# Patient Record
Sex: MALE | Race: WHITE | NOT HISPANIC OR LATINO | Employment: OTHER | URBAN - METROPOLITAN AREA
[De-identification: names, ages, dates, MRNs, and addresses within clinical notes are randomized per-mention and may not be internally consistent; named-entity substitution may affect disease eponyms.]

---

## 2023-06-28 PROBLEM — E78.2 MIXED HYPERLIPIDEMIA: Status: ACTIVE | Noted: 2023-06-28

## 2023-06-28 PROBLEM — J30.1 ALLERGIC RHINITIS DUE TO POLLEN: Status: ACTIVE | Noted: 2023-06-28

## 2023-06-28 PROBLEM — Z12.5 SCREENING PSA (PROSTATE SPECIFIC ANTIGEN): Status: ACTIVE | Noted: 2023-06-28

## 2023-06-28 PROBLEM — M48.062 SPINAL STENOSIS OF LUMBAR REGION WITH NEUROGENIC CLAUDICATION: Status: ACTIVE | Noted: 2023-06-28

## 2023-06-28 PROBLEM — E55.9 VITAMIN D DEFICIENCY: Status: ACTIVE | Noted: 2023-06-28

## 2023-07-26 ENCOUNTER — CLINICAL SUPPORT (OUTPATIENT)
Dept: INTERNAL MEDICINE | Facility: CLINIC | Age: 73
End: 2023-07-26
Payer: MEDICARE

## 2023-07-26 DIAGNOSIS — J30.1 ALLERGIC RHINITIS DUE TO POLLEN, UNSPECIFIED SEASONALITY: ICD-10-CM

## 2023-07-26 DIAGNOSIS — M48.062 SPINAL STENOSIS OF LUMBAR REGION WITH NEUROGENIC CLAUDICATION: ICD-10-CM

## 2023-07-26 DIAGNOSIS — Z12.5 SCREENING PSA (PROSTATE SPECIFIC ANTIGEN): ICD-10-CM

## 2023-07-26 DIAGNOSIS — E55.9 VITAMIN D DEFICIENCY: ICD-10-CM

## 2023-07-26 DIAGNOSIS — E78.2 MIXED HYPERLIPIDEMIA: ICD-10-CM

## 2023-07-26 LAB
25(OH)D3 SERPL-MCNC: 26.5 NG/ML (ref 30–100)
ALBUMIN SERPL-MCNC: 3.9 G/DL (ref 3.5–5.2)
ALBUMIN/GLOB SERPL: 1.3 G/DL
ALP SERPL-CCNC: 74 U/L (ref 39–117)
ALT SERPL W P-5'-P-CCNC: 18 U/L (ref 1–41)
ANION GAP SERPL CALCULATED.3IONS-SCNC: 11.3 MMOL/L (ref 5–15)
AST SERPL-CCNC: 28 U/L (ref 1–40)
BASOPHILS # BLD AUTO: 0.09 10*3/MM3 (ref 0–0.2)
BASOPHILS NFR BLD AUTO: 1 % (ref 0–1.5)
BILIRUB SERPL-MCNC: 0.3 MG/DL (ref 0–1.2)
BUN SERPL-MCNC: 14 MG/DL (ref 8–23)
BUN/CREAT SERPL: 14.6 (ref 7–25)
CALCIUM SPEC-SCNC: 9 MG/DL (ref 8.6–10.5)
CHLORIDE SERPL-SCNC: 108 MMOL/L (ref 98–107)
CHOLEST SERPL-MCNC: 158 MG/DL (ref 0–200)
CO2 SERPL-SCNC: 22.7 MMOL/L (ref 22–29)
CREAT SERPL-MCNC: 0.96 MG/DL (ref 0.76–1.27)
DEPRECATED RDW RBC AUTO: 46.2 FL (ref 37–54)
EGFRCR SERPLBLD CKD-EPI 2021: 85 ML/MIN/1.73
EOSINOPHIL # BLD AUTO: 0.21 10*3/MM3 (ref 0–0.4)
EOSINOPHIL NFR BLD AUTO: 2.4 % (ref 0.3–6.2)
ERYTHROCYTE [DISTWIDTH] IN BLOOD BY AUTOMATED COUNT: 13.7 % (ref 12.3–15.4)
FOLATE SERPL-MCNC: 9.93 NG/ML (ref 4.78–24.2)
GLOBULIN UR ELPH-MCNC: 2.9 GM/DL
GLUCOSE SERPL-MCNC: 107 MG/DL (ref 65–99)
HCT VFR BLD AUTO: 50.1 % (ref 37.5–51)
HCV AB SER DONR QL: NORMAL
HDLC SERPL-MCNC: 49 MG/DL (ref 40–60)
HGB BLD-MCNC: 16.7 G/DL (ref 13–17.7)
IMM GRANULOCYTES # BLD AUTO: 0.08 10*3/MM3 (ref 0–0.05)
IMM GRANULOCYTES NFR BLD AUTO: 0.9 % (ref 0–0.5)
LDLC SERPL CALC-MCNC: 99 MG/DL (ref 0–100)
LDLC/HDLC SERPL: 2.03 {RATIO}
LYMPHOCYTES # BLD AUTO: 1.55 10*3/MM3 (ref 0.7–3.1)
LYMPHOCYTES NFR BLD AUTO: 18 % (ref 19.6–45.3)
MCH RBC QN AUTO: 31.2 PG (ref 26.6–33)
MCHC RBC AUTO-ENTMCNC: 33.3 G/DL (ref 31.5–35.7)
MCV RBC AUTO: 93.6 FL (ref 79–97)
MONOCYTES # BLD AUTO: 0.93 10*3/MM3 (ref 0.1–0.9)
MONOCYTES NFR BLD AUTO: 10.8 % (ref 5–12)
NEUTROPHILS NFR BLD AUTO: 5.77 10*3/MM3 (ref 1.7–7)
NEUTROPHILS NFR BLD AUTO: 66.9 % (ref 42.7–76)
NRBC BLD AUTO-RTO: 0 /100 WBC (ref 0–0.2)
PLATELET # BLD AUTO: 231 10*3/MM3 (ref 140–450)
PMV BLD AUTO: 10.6 FL (ref 6–12)
POTASSIUM SERPL-SCNC: 5.1 MMOL/L (ref 3.5–5.2)
PROT SERPL-MCNC: 6.8 G/DL (ref 6–8.5)
PSA SERPL-MCNC: 1.11 NG/ML (ref 0–4)
RBC # BLD AUTO: 5.35 10*6/MM3 (ref 4.14–5.8)
SODIUM SERPL-SCNC: 142 MMOL/L (ref 136–145)
T4 FREE SERPL-MCNC: 0.98 NG/DL (ref 0.93–1.7)
TRIGL SERPL-MCNC: 48 MG/DL (ref 0–150)
TSH SERPL DL<=0.05 MIU/L-ACNC: 2.22 UIU/ML (ref 0.27–4.2)
VIT B12 BLD-MCNC: 512 PG/ML (ref 211–946)
VLDLC SERPL-MCNC: 10 MG/DL (ref 5–40)
WBC NRBC COR # BLD: 8.63 10*3/MM3 (ref 3.4–10.8)

## 2023-07-26 PROCEDURE — 82607 VITAMIN B-12: CPT | Performed by: INTERNAL MEDICINE

## 2023-07-26 PROCEDURE — 86803 HEPATITIS C AB TEST: CPT | Performed by: INTERNAL MEDICINE

## 2023-07-26 PROCEDURE — 36415 COLL VENOUS BLD VENIPUNCTURE: CPT | Performed by: INTERNAL MEDICINE

## 2023-07-26 PROCEDURE — 80061 LIPID PANEL: CPT | Performed by: INTERNAL MEDICINE

## 2023-07-26 PROCEDURE — G0103 PSA SCREENING: HCPCS | Performed by: INTERNAL MEDICINE

## 2023-07-26 PROCEDURE — 85025 COMPLETE CBC W/AUTO DIFF WBC: CPT | Performed by: INTERNAL MEDICINE

## 2023-07-26 PROCEDURE — 84439 ASSAY OF FREE THYROXINE: CPT | Performed by: INTERNAL MEDICINE

## 2023-07-26 PROCEDURE — 80053 COMPREHEN METABOLIC PANEL: CPT | Performed by: INTERNAL MEDICINE

## 2023-07-26 PROCEDURE — 84443 ASSAY THYROID STIM HORMONE: CPT | Performed by: INTERNAL MEDICINE

## 2023-07-26 PROCEDURE — 82306 VITAMIN D 25 HYDROXY: CPT | Performed by: INTERNAL MEDICINE

## 2023-07-26 PROCEDURE — 82746 ASSAY OF FOLIC ACID SERUM: CPT | Performed by: INTERNAL MEDICINE

## 2023-07-31 ENCOUNTER — OFFICE VISIT (OUTPATIENT)
Dept: INTERNAL MEDICINE | Facility: CLINIC | Age: 73
End: 2023-07-31
Payer: MEDICARE

## 2023-07-31 VITALS
WEIGHT: 222.6 LBS | TEMPERATURE: 98 F | BODY MASS INDEX: 30.15 KG/M2 | OXYGEN SATURATION: 95 % | HEART RATE: 80 BPM | DIASTOLIC BLOOD PRESSURE: 93 MMHG | HEIGHT: 72 IN | SYSTOLIC BLOOD PRESSURE: 171 MMHG

## 2023-07-31 DIAGNOSIS — M48.062 SPINAL STENOSIS OF LUMBAR REGION WITH NEUROGENIC CLAUDICATION: ICD-10-CM

## 2023-07-31 DIAGNOSIS — Z12.5 SCREENING PSA (PROSTATE SPECIFIC ANTIGEN): ICD-10-CM

## 2023-07-31 DIAGNOSIS — R73.01 IFG (IMPAIRED FASTING GLUCOSE): ICD-10-CM

## 2023-07-31 DIAGNOSIS — E78.2 MIXED HYPERLIPIDEMIA: ICD-10-CM

## 2023-07-31 DIAGNOSIS — Z00.00 MEDICARE ANNUAL WELLNESS VISIT, SUBSEQUENT: Primary | ICD-10-CM

## 2023-07-31 DIAGNOSIS — E55.9 VITAMIN D DEFICIENCY: ICD-10-CM

## 2023-07-31 PROCEDURE — 99213 OFFICE O/P EST LOW 20 MIN: CPT | Performed by: INTERNAL MEDICINE

## 2023-07-31 PROCEDURE — G0439 PPPS, SUBSEQ VISIT: HCPCS | Performed by: INTERNAL MEDICINE

## 2023-11-20 ENCOUNTER — PROCEDURE VISIT (OUTPATIENT)
Dept: OTOLARYNGOLOGY | Facility: CLINIC | Age: 73
End: 2023-11-20
Payer: MEDICARE

## 2023-11-20 ENCOUNTER — OFFICE VISIT (OUTPATIENT)
Dept: OTOLARYNGOLOGY | Facility: CLINIC | Age: 73
End: 2023-11-20
Payer: MEDICARE

## 2023-11-20 VITALS
SYSTOLIC BLOOD PRESSURE: 161 MMHG | BODY MASS INDEX: 30.18 KG/M2 | HEART RATE: 81 BPM | HEIGHT: 72 IN | DIASTOLIC BLOOD PRESSURE: 82 MMHG | TEMPERATURE: 97.6 F

## 2023-11-20 DIAGNOSIS — J30.0 VASOMOTOR RHINITIS: ICD-10-CM

## 2023-11-20 DIAGNOSIS — H90.A22 SENSORINEURAL HEARING LOSS (SNHL) OF LEFT EAR WITH RESTRICTED HEARING OF RIGHT EAR: ICD-10-CM

## 2023-11-20 DIAGNOSIS — H90.A21 SENSORINEURAL HEARING LOSS (SNHL) OF RIGHT EAR WITH RESTRICTED HEARING OF LEFT EAR: Primary | ICD-10-CM

## 2023-11-20 DIAGNOSIS — Z72.0 TOBACCO ABUSE: ICD-10-CM

## 2023-11-20 DIAGNOSIS — H90.A31 MIXED CONDUCTIVE AND SENSORINEURAL HEARING LOSS OF RIGHT EAR WITH RESTRICTED HEARING OF LEFT EAR: ICD-10-CM

## 2023-11-20 DIAGNOSIS — G51.0 PERIPHERAL NERVE FACIAL NERVE PARALYSIS: ICD-10-CM

## 2023-11-20 DIAGNOSIS — H90.3 SENSORINEURAL HEARING LOSS, BILATERAL: ICD-10-CM

## 2023-11-20 DIAGNOSIS — T16.1XXA ACUTE FOREIGN BODY OF RIGHT EAR CANAL, INITIAL ENCOUNTER: Primary | ICD-10-CM

## 2023-11-20 DIAGNOSIS — L74.519 GUSTATORY SWEATING: ICD-10-CM

## 2023-11-20 PROCEDURE — 1159F MED LIST DOCD IN RCRD: CPT | Performed by: OTOLARYNGOLOGY

## 2023-11-20 PROCEDURE — 69200 CLEAR OUTER EAR CANAL: CPT | Performed by: OTOLARYNGOLOGY

## 2023-11-20 PROCEDURE — 99204 OFFICE O/P NEW MOD 45 MIN: CPT | Performed by: OTOLARYNGOLOGY

## 2023-11-20 PROCEDURE — 1160F RVW MEDS BY RX/DR IN RCRD: CPT | Performed by: OTOLARYNGOLOGY

## 2023-11-20 RX ORDER — IPRATROPIUM BROMIDE 42 UG/1
2 SPRAY, METERED NASAL 3 TIMES DAILY
Qty: 45 ML | Refills: 6 | Status: SHIPPED | OUTPATIENT
Start: 2023-11-20

## 2023-11-20 NOTE — PROGRESS NOTES
Patient Name: Jose Francisco Gold   Visit Date: 11/20/2023   Patient ID: [id number]  Provider: Cl Gipson MD    Sex: male  Location: [facility]   YOB: 1950  Location Address: [address] [place] [address], [address] [place] [address],?[place]?[id number]    Primary Care Provider Jamar Sher MD  Location Phone: [phone]    Referring Provider: No ref. provider found        Chief Complaint  RIGHT EAR HEARING LOSS,WAX IMPACTION    History of Present Illness  Jose Francisco Gold is a 73 y.o. male who presents to [facility] for RIGHT EAR HEARING LOSS,WAX IMPACTION.  Patient has history of sensorineural hearing loss he was last seen by Ms. Mary Cohen at [facility] a month ago for ear cleaning.  Patient's last audiogram was by Teddy Perez at [facility] better hearing aid about 4 years ago.  Patient is here to have his ears checked as it is making sure there is no growth as suggested from previous exam.  Also patient has a history of left parotidectomy done in [place] several years ago and has the benign mass removed but he resulted and having a frontal branch of the facial nerve paralyzed.  He also reports having's sweating when he is eating from the left side suggestive of gustatory sweating as well.  Unfortunately also patient has left side of the nose with draining when he eats also but I suspect this may be due to vasomotor rhinitis and not related to the parotidectomy.    Past Medical History:   Diagnosis Date    High cholesterol        History reviewed. No pertinent surgical history.      Current Outpatient Medications:     atorvastatin (LIPITOR) 20 MG tablet, Take 1 tablet by mouth Daily., Disp: 90 tablet, Rfl: 3    sildenafil (VIAGRA) 100 MG tablet, Take 1 tablet by mouth As Needed for Erectile Dysfunction., Disp: 10 tablet, Rfl: 5    vitamin B-12 (CYANOCOBALAMIN) 500 MCG tablet, Take 0.5 tablets by mouth Daily., Disp: , Rfl:     ipratropium (ATROVENT) 0.06 %  "nasal spray, 2 sprays into the nostril(s) as directed by provider 3 (Three) Times a Day., Disp: 45 mL, Rfl: 6     No Known Allergies    Social History     Tobacco Use    Smoking status: Every Day     Types: Cigars    Smokeless tobacco: Never   Vaping Use    Vaping Use: Never used   Substance Use Topics    Alcohol use: Yes     Comment: 4 beers a day    Drug use: Never        Objective     Vital Signs:   Vitals:    11/20/23 0751   BP: 161/82   BP Location: Left arm   Patient Position: Sitting   Cuff Size: Adult   Pulse: 81   Temp: 97.6 °F (36.4 °C)   TempSrc: Temporal   Height: 182.9 cm (72.01\")       Tobacco Use: High Risk (11/20/2023)    Patient History     Smoking Tobacco Use: Every Day     Smokeless Tobacco Use: Never     Passive Exposure: Not on file         Physical Exam    Constitutional   Appearance  well developed, well-nourished, alert and in no acute distress, voice clear and strong    Head   Inspection  no deformities or lesions      Face   Inspection  no facial lesions; left frontal branch of facial nerve total paralysis otherwise rest of the facial nerve branches were unremarkable.   Palpation  no TMJ crepitus nor  muscle tenderness bilaterally.  Also left preauricular area consistent with prior left parotidectomy incision healed.    Eyes/Vision   Visual Fields  extraocular movements are intact, no spontaneous or gaze-induced nystagmus  Conjunctivae  clear   Sclerae  clear   Pupils and Irises  pupils equal, round, and reactive to light.   Nystagmus  not present     Ears, Nose, Mouth and Throat  Ears  External Ears  appearance within normal limits, no lesions present   Otoscopic Examination  Left TM and EACs are clear.  Right ear canal was unremarkable but the against the tympanic membrane is a cotton foreign body removed along with some of the moisture present and a little bit of sunken area up against the tympanic membrane.  And this was all cleaned and removed with aid of " microscope.  Hearing  intact to conversational voice both ears.  Patient has hearing loss and does wear hearing aids on both ears  Tunning fork testing    Rinne:  Pickett:    Nose  External Nose  appearance normal   Intranasal Exam  mucosa within normal limits, vestibules normal, no intranasal lesions present, septum midline, sinuses non tender to percussion   Modified Schoolcraft Test:    Oral Cavity  Oral Mucosa  oral mucosa normal without pallor or cyanosis   Lips  lip appearance normal   Teeth  normal dentition for age   Gums  gums pink, non-swollen, no bleeding present   Tongue  tongue appearance normal; normal mobility   Palate  hard palate normal, soft palate appearance normal with symmetric mobility     Throat  Oropharynx  no inflammation or lesions present, tonsils within normal limits   Hypopharynx  appearance within normal limits   Larynx  voice normal     Neck  Inspection/Palpation  normal appearance, no masses or tenderness, trachea midline; thyroid size normal, nontender, no nodules or masses present on palpation     Lymphatic  Neck  no lymphadenopathy present   Supraclavicular Nodes  no lymphadenopathy present   Preauricular Nodes  no lymphadenopathy present     Respiratory  Respiratory Effort  breathing unlabored   Inspection of Chest  normal appearance, no retractions     Musculoskeletal   Cervical back: Normal range of motion and neck supple.      Skin and Subcutaneous Tissue  General Inspection  Regarding face and neck - there are no rashes present, no lesions present, and no areas of discoloration     Neurologic  Cranial Nerves  cranial nerves II-XII are grossly intact bilaterally.  Please see the above description of left frontal branch of facial nerve paralysis  Gait and Station  normal gait, able to stand without diffculty    Psychiatric  Judgement and Insight  judgment and insight intact   Mood and Affect  mood normal, affect appropriate       RESULTS REVIEWED    I have reviewed the following  information:   [x]  Previous Internal Note  [x]  Previous External Note:   [x]  Ordered Tests & Results:      Pathology:      TSH   Date Value Ref Range Status   07/26/2023 2.220 0.270 - 4.200 uIU/mL Final     Free T4   Date Value Ref Range Status   07/26/2023 0.98 0.93 - 1.70 ng/dL Final     Comment:     T4 results may be falsely increased if patient taking Biotin.     Calcium   Date Value Ref Range Status   07/26/2023 9.0 8.6 - 10.5 mg/dL Final   01/13/2022 8.7 8.6 - 10.3 mg/dL Final     25 Hydroxy, Vitamin D   Date Value Ref Range Status   07/26/2023 26.5 (L) 30.0 - 100.0 ng/ml Final       No Images in the past 120 days found..    I have discussed the interpretation of the above results with the patient.    Foreign Body Removal    Date/Time: 11/20/2023 8:57 AM    Performed by: Cl Gipson MD  Authorized by: Cl Gipson MD  Body area: ear  Location details: right ear  Removal mechanism: alligator forceps and suction  Post-procedure assessment: foreign body removed  Comments: Patient deep in the right ear canal with foreign body cotton stuck against tympanic membrane a little sunken area of the inferior aspect of the canal which was removed under the aid of microscope using alligator forceps and suction.  Also residual moisture caused epithelial debris accumulation that was removed with suction as well.  However there is no acute infection present at this time.              Assessment and Plan   Diagnoses and all orders for this visit:    1. Acute foreign body of right ear canal, initial encounter (Primary)  -     Foreign Body Removal    2. Mixed conductive and sensorineural hearing loss of right ear with restricted hearing of left ear  -     Foreign Body Removal    3. Sensorineural hearing loss, bilateral  -     Comprehensive Hearing Test; Future    4. Tobacco abuse    5. Peripheral nerve facial nerve paralysis    6. Gustatory sweating    7. Vasomotor rhinitis  -     ipratropium (ATROVENT) 0.06 % nasal  spray; 2 sprays into the nostril(s) as directed by provider 3 (Three) Times a Day.  Dispense: 45 mL; Refill: 6        Jose Francisco Gold  reports that he has been smoking cigars. He has never used smokeless tobacco. I have educated him on the risk of diseases from using tobacco products such as Cancer, COPD & Heart Disease.     I advised him to quit smoking cigars.  I spent 5 minutes counseling the patient.       Plan:  Patient Instructions   1.  Patient has coincidental right ear canal foreign body removed with microscope.  There was no residual infection present.  2.  Patient's conductive component had resolved on the hearing loss but he does have a long term bilateral sensorineural hearing loss.  He wears hearing aids in both ears.  3.  Patient has bilateral sensorineural hearing loss with last audiogram about 4 years ago.  We will proceed with hearing test today if possible.  4.  Patient has tobacco abuse from lung history of cigar smoking and recommended he quit that as well.  5.  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  6.  Patient also from left parotidectomy has gustatory sweating which I recommend using some antiperspirant.  7.  For vasomotor rhinitis I would recommend trying Atrovent nasal spray and hope that this would help with his runny nose while he is eating.  He is to use 2 puffs in the left nostril half hour before meal 3 times a day.  8.  I will see Mr. Gold back probably and 6 months for ear exam and cleaning.    57 minutes were spent caring for Jose Francisco on this date of service. This time spent by me includes preparing for the visit, reviewing tests, obtaining/reviewing separately obtained history, performing medically appropriate exam/evaluation, counseling/educating the patient/family/caregiver, ordering medications/tests/procedures, referring/communicating with other health care  professionals, documenting information in the medical record, independently interpreting results and communicating that with the patient/family/caregiver and/or care coordination.       Follow Up   Return in about 6 months (around 5/20/2024), or Ear exam and cleaning.  Patient was given instructions and counseling regarding his condition or for health maintenance advice. Please see specific information pulled into the AVS if appropriate.

## 2023-11-20 NOTE — PATIENT INSTRUCTIONS
1.  Patient has coincidental right ear canal foreign body removed with microscope.  There was no residual infection present.  2.  Patient's conductive component had resolved on the hearing loss but he does have a long term bilateral sensorineural hearing loss.  He wears hearing aids in both ears.  3.  Patient has bilateral sensorineural hearing loss with last audiogram about 4 years ago.  We will proceed with hearing test today if possible.  4.  Patient has tobacco abuse from lung history of cigar smoking and recommended he quit that as well.  5.  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  6.  Patient also from left parotidectomy has gustatory sweating which I recommend using some antiperspirant.  7.  For vasomotor rhinitis I would recommend trying Atrovent nasal spray and hope that this would help with his runny nose while he is eating.  He is to use 2 puffs in the left nostril half hour before meal 3 times a day.  8.  I will see Mr. Gold back probably and 6 months for ear exam and cleaning.

## 2023-11-20 NOTE — PROGRESS NOTES
AUDIOMETRIC EVALUATION      Name:  Jose Francisco Gold  :  1950  Age:  73 y.o.  Date of Evaluation:  2023       History:  Mr. Gold is seen today for a hearing evaluation due to hearing loss.    Audiologic Information:  Concerns for Hearing: Yes  PETs: No  Other otologic surgical history: None  Aural Pressure/Fullness: No  Otalgia: None  Otorrhea: No  Tinnitus: Yes each ear  Dizziness: No  Noise Exposure: Yes  Family history of hearing loss: No  Head trauma requiring hospital stay: None  Chemotherapy: No  Other significant history: No    EVALUATION:    See audiogram    RESULTS:    Otoscopic Evaluation:        NOTE: Testing completed after ears were examined by Dr. Gipson    Tympanometry (226 Hz):  Right: Type A  Left: Type A    IMPRESSIONS:  Pure tone thresholds for the right ear shows a mild sensorineural hearing loss at 0.25 K to 1K hertz.  Severe to profound hearing loss at 1.5K to 8K hertz.  Pure tone thresholds for the left ear shows a moderate to profound sensorineural hearing loss.  Patient was counseled with regard to the findings.    Amplification needs: Currently wears binaural hearing instruments    RECOMMENDATIONS/PLAN:  Follow-up recommendations of Dr. Gipson  Discussed results and recommendations with patient.           Matias العراقي M.S, [facility]  Licensed Audiologist

## 2024-01-18 ENCOUNTER — CLINICAL SUPPORT (OUTPATIENT)
Dept: INTERNAL MEDICINE | Facility: CLINIC | Age: 74
End: 2024-01-18
Payer: MEDICARE

## 2024-01-18 DIAGNOSIS — Z00.00 MEDICARE ANNUAL WELLNESS VISIT, SUBSEQUENT: ICD-10-CM

## 2024-01-18 DIAGNOSIS — E55.9 VITAMIN D DEFICIENCY: ICD-10-CM

## 2024-01-18 DIAGNOSIS — E78.2 MIXED HYPERLIPIDEMIA: ICD-10-CM

## 2024-01-18 DIAGNOSIS — M48.062 SPINAL STENOSIS OF LUMBAR REGION WITH NEUROGENIC CLAUDICATION: ICD-10-CM

## 2024-01-18 DIAGNOSIS — Z12.5 SCREENING PSA (PROSTATE SPECIFIC ANTIGEN): ICD-10-CM

## 2024-01-18 DIAGNOSIS — R73.01 IFG (IMPAIRED FASTING GLUCOSE): ICD-10-CM

## 2024-01-18 LAB
ALBUMIN SERPL-MCNC: 4.1 G/DL (ref 3.5–5.2)
ALBUMIN/GLOB SERPL: 1.5 G/DL
ALP SERPL-CCNC: 83 U/L (ref 39–117)
ALT SERPL W P-5'-P-CCNC: 16 U/L (ref 1–41)
ANION GAP SERPL CALCULATED.3IONS-SCNC: 10 MMOL/L (ref 5–15)
AST SERPL-CCNC: 24 U/L (ref 1–40)
BASOPHILS # BLD AUTO: 0.06 10*3/MM3 (ref 0–0.2)
BASOPHILS NFR BLD AUTO: 0.7 % (ref 0–1.5)
BILIRUB SERPL-MCNC: 0.2 MG/DL (ref 0–1.2)
BUN SERPL-MCNC: 13 MG/DL (ref 8–23)
BUN/CREAT SERPL: 12.4 (ref 7–25)
CALCIUM SPEC-SCNC: 9.3 MG/DL (ref 8.6–10.5)
CHLORIDE SERPL-SCNC: 106 MMOL/L (ref 98–107)
CHOLEST SERPL-MCNC: 153 MG/DL (ref 0–200)
CO2 SERPL-SCNC: 25 MMOL/L (ref 22–29)
CREAT SERPL-MCNC: 1.05 MG/DL (ref 0.76–1.27)
DEPRECATED RDW RBC AUTO: 45.9 FL (ref 37–54)
EGFRCR SERPLBLD CKD-EPI 2021: 75.9 ML/MIN/1.73
EOSINOPHIL # BLD AUTO: 0.21 10*3/MM3 (ref 0–0.4)
EOSINOPHIL NFR BLD AUTO: 2.3 % (ref 0.3–6.2)
ERYTHROCYTE [DISTWIDTH] IN BLOOD BY AUTOMATED COUNT: 13.8 % (ref 12.3–15.4)
GLOBULIN UR ELPH-MCNC: 2.8 GM/DL
GLUCOSE SERPL-MCNC: 101 MG/DL (ref 65–99)
HBA1C MFR BLD: 6 % (ref 4.8–5.6)
HCT VFR BLD AUTO: 47.7 % (ref 37.5–51)
HDLC SERPL-MCNC: 47 MG/DL (ref 40–60)
HGB BLD-MCNC: 15.6 G/DL (ref 13–17.7)
IMM GRANULOCYTES # BLD AUTO: 0.05 10*3/MM3 (ref 0–0.05)
IMM GRANULOCYTES NFR BLD AUTO: 0.5 % (ref 0–0.5)
LDLC SERPL CALC-MCNC: 95 MG/DL (ref 0–100)
LDLC/HDLC SERPL: 2.04 {RATIO}
LYMPHOCYTES # BLD AUTO: 1.41 10*3/MM3 (ref 0.7–3.1)
LYMPHOCYTES NFR BLD AUTO: 15.4 % (ref 19.6–45.3)
MCH RBC QN AUTO: 29.8 PG (ref 26.6–33)
MCHC RBC AUTO-ENTMCNC: 32.7 G/DL (ref 31.5–35.7)
MCV RBC AUTO: 91.2 FL (ref 79–97)
MONOCYTES # BLD AUTO: 1.08 10*3/MM3 (ref 0.1–0.9)
MONOCYTES NFR BLD AUTO: 11.8 % (ref 5–12)
NEUTROPHILS NFR BLD AUTO: 6.37 10*3/MM3 (ref 1.7–7)
NEUTROPHILS NFR BLD AUTO: 69.3 % (ref 42.7–76)
NRBC BLD AUTO-RTO: 0 /100 WBC (ref 0–0.2)
PLATELET # BLD AUTO: 243 10*3/MM3 (ref 140–450)
PMV BLD AUTO: 10.6 FL (ref 6–12)
POTASSIUM SERPL-SCNC: 5.1 MMOL/L (ref 3.5–5.2)
PROT SERPL-MCNC: 6.9 G/DL (ref 6–8.5)
RBC # BLD AUTO: 5.23 10*6/MM3 (ref 4.14–5.8)
SODIUM SERPL-SCNC: 141 MMOL/L (ref 136–145)
TRIGL SERPL-MCNC: 50 MG/DL (ref 0–150)
VLDLC SERPL-MCNC: 11 MG/DL (ref 5–40)
WBC NRBC COR # BLD AUTO: 9.18 10*3/MM3 (ref 3.4–10.8)

## 2024-01-18 PROCEDURE — 83036 HEMOGLOBIN GLYCOSYLATED A1C: CPT | Performed by: INTERNAL MEDICINE

## 2024-01-18 PROCEDURE — 85025 COMPLETE CBC W/AUTO DIFF WBC: CPT | Performed by: INTERNAL MEDICINE

## 2024-01-18 PROCEDURE — 80061 LIPID PANEL: CPT | Performed by: INTERNAL MEDICINE

## 2024-01-18 PROCEDURE — 80053 COMPREHEN METABOLIC PANEL: CPT | Performed by: INTERNAL MEDICINE

## 2024-01-18 PROCEDURE — 36415 COLL VENOUS BLD VENIPUNCTURE: CPT | Performed by: INTERNAL MEDICINE

## 2024-01-31 ENCOUNTER — OFFICE VISIT (OUTPATIENT)
Dept: INTERNAL MEDICINE | Facility: CLINIC | Age: 74
End: 2024-01-31
Payer: MEDICARE

## 2024-01-31 VITALS
WEIGHT: 227 LBS | HEART RATE: 71 BPM | DIASTOLIC BLOOD PRESSURE: 85 MMHG | OXYGEN SATURATION: 94 % | BODY MASS INDEX: 30.75 KG/M2 | SYSTOLIC BLOOD PRESSURE: 148 MMHG | HEIGHT: 72 IN | TEMPERATURE: 98.6 F

## 2024-01-31 DIAGNOSIS — Z12.5 SCREENING PSA (PROSTATE SPECIFIC ANTIGEN): ICD-10-CM

## 2024-01-31 DIAGNOSIS — R73.01 IFG (IMPAIRED FASTING GLUCOSE): ICD-10-CM

## 2024-01-31 DIAGNOSIS — E78.2 MIXED HYPERLIPIDEMIA: ICD-10-CM

## 2024-01-31 DIAGNOSIS — H90.3 SENSORINEURAL HEARING LOSS, BILATERAL: ICD-10-CM

## 2024-01-31 DIAGNOSIS — J30.1 SEASONAL ALLERGIC RHINITIS DUE TO POLLEN: ICD-10-CM

## 2024-01-31 DIAGNOSIS — E55.9 VITAMIN D DEFICIENCY: ICD-10-CM

## 2024-01-31 DIAGNOSIS — Z87.891 PERSONAL HISTORY OF NICOTINE DEPENDENCE: ICD-10-CM

## 2024-01-31 DIAGNOSIS — M48.062 SPINAL STENOSIS OF LUMBAR REGION WITH NEUROGENIC CLAUDICATION: Primary | ICD-10-CM

## 2024-01-31 DIAGNOSIS — Z72.0 TOBACCO ABUSE: ICD-10-CM

## 2024-01-31 PROCEDURE — 1170F FXNL STATUS ASSESSED: CPT | Performed by: INTERNAL MEDICINE

## 2024-01-31 PROCEDURE — 99214 OFFICE O/P EST MOD 30 MIN: CPT | Performed by: INTERNAL MEDICINE

## 2024-01-31 NOTE — PROGRESS NOTES
"CHIEF COMPLAINT/ HPI:  Hyperlipidemia (Routine follow up. Lab follow up. Referral to Gastro for Colonoscopy, been 2 years since last one. )              Objective   Vital Signs  Vitals:    01/31/24 1204   BP: 148/85   Pulse: 71   Temp: 98.6 °F (37 °C)   SpO2: 94%   Weight: 103 kg (227 lb)   Height: 182.9 cm (72.01\")      Body mass index is 30.78 kg/m².  Review of Systems   Constitutional: Negative.    HENT: Negative.     Eyes: Negative.    Respiratory: Negative.     Cardiovascular: Negative.    Gastrointestinal: Negative.    Endocrine: Negative.    Genitourinary: Negative.    Musculoskeletal: Negative.    Allergic/Immunologic: Negative.    Neurological: Negative.    Hematological: Negative.    Psychiatric/Behavioral: Negative.        Physical Exam  Constitutional:       General: He is not in acute distress.     Appearance: Normal appearance.   HENT:      Head: Normocephalic.      Mouth/Throat:      Mouth: Mucous membranes are moist.   Eyes:      Conjunctiva/sclera: Conjunctivae normal.      Pupils: Pupils are equal, round, and reactive to light.   Cardiovascular:      Rate and Rhythm: Normal rate and regular rhythm.      Pulses: Normal pulses.      Heart sounds: Normal heart sounds.   Pulmonary:      Effort: Pulmonary effort is normal.      Breath sounds: Normal breath sounds.   Abdominal:      General: Abdomen is flat. Bowel sounds are normal.      Palpations: Abdomen is soft.   Musculoskeletal:         General: No swelling. Normal range of motion.      Cervical back: Neck supple.   Skin:     General: Skin is warm and dry.      Coloration: Skin is not jaundiced.   Neurological:      General: No focal deficit present.      Mental Status: He is alert and oriented to person, place, and time. Mental status is at baseline.   Psychiatric:         Mood and Affect: Mood normal.         Behavior: Behavior normal.         Thought Content: Thought content normal.         Judgment: Judgment normal.        Result Review :   No " "results found for: \"PROBNP\", \"BNP\"  CMP          7/26/2023    10:40 1/18/2024    09:06   CMP   Glucose 107  101    BUN 14  13    Creatinine 0.96  1.05    EGFR 85.0  75.9    Sodium 142  141    Potassium 5.1  5.1    Chloride 108  106    Calcium 9.0  9.3    Total Protein 6.8  6.9    Albumin 3.9  4.1    Globulin 2.9  2.8    Total Bilirubin 0.3  0.2    Alkaline Phosphatase 74  83    AST (SGOT) 28  24    ALT (SGPT) 18  16    Albumin/Globulin Ratio 1.3  1.5    BUN/Creatinine Ratio 14.6  12.4    Anion Gap 11.3  10.0      CBC w/diff          7/26/2023    10:40 1/18/2024    09:06   CBC w/Diff   WBC 8.63  9.18    RBC 5.35  5.23    Hemoglobin 16.7  15.6    Hematocrit 50.1  47.7    MCV 93.6  91.2    MCH 31.2  29.8    MCHC 33.3  32.7    RDW 13.7  13.8    Platelets 231  243    Neutrophil Rel % 66.9  69.3    Immature Granulocyte Rel % 0.9  0.5    Lymphocyte Rel % 18.0  15.4    Monocyte Rel % 10.8  11.8    Eosinophil Rel % 2.4  2.3    Basophil Rel % 1.0  0.7       Lipid Panel          7/26/2023    10:40 1/18/2024    09:06   Lipid Panel   Total Cholesterol 158  153    Triglycerides 48  50    HDL Cholesterol 49  47    VLDL Cholesterol 10  11    LDL Cholesterol  99  95    LDL/HDL Ratio 2.03  2.04       Lab Results   Component Value Date    TSH 2.220 07/26/2023      Lab Results   Component Value Date    FREET4 0.98 07/26/2023      A1C Last 3 Results          1/18/2024    09:06   HGBA1C Last 3 Results   Hemoglobin A1C 6.00       PSA          7/26/2023    10:40   PSA   PSA 1.110                     Visit Diagnoses:    ICD-10-CM ICD-9-CM   1. Spinal stenosis of lumbar region with neurogenic claudication  M48.062 724.03   2. Mixed hyperlipidemia  E78.2 272.2   3. Vitamin D deficiency  E55.9 268.9   4. Sensorineural hearing loss, bilateral  H90.3 389.18   5. Screening PSA (prostate specific antigen)  Z12.5 V76.44   6. Tobacco abuse  Z72.0 305.1   7. Seasonal allergic rhinitis due to pollen  J30.1 477.0   8. IFG  R73.01 790.21   9. Personal " history of nicotine dependence  Z87.891 V15.82       Assessment and Plan   Diagnoses and all orders for this visit:    1. Spinal stenosis of lumbar region with neurogenic claudication (Primary)  -     Comprehensive Metabolic Panel; Future  -     Lipid Panel; Future  -     Hemoglobin A1c; Future  -     PSA Screen; Future  -      CT Chest Low Dose Cancer Screening WO; Future    2. Mixed hyperlipidemia  -     Comprehensive Metabolic Panel; Future  -     Lipid Panel; Future  -     Hemoglobin A1c; Future  -     PSA Screen; Future  -      CT Chest Low Dose Cancer Screening WO; Future    3. Vitamin D deficiency  -     Comprehensive Metabolic Panel; Future  -     Lipid Panel; Future  -     Hemoglobin A1c; Future  -     PSA Screen; Future  -      CT Chest Low Dose Cancer Screening WO; Future    4. Sensorineural hearing loss, bilateral  -     Comprehensive Metabolic Panel; Future  -     Lipid Panel; Future  -     Hemoglobin A1c; Future  -     PSA Screen; Future  -      CT Chest Low Dose Cancer Screening WO; Future    5. Screening PSA (prostate specific antigen)  -     Comprehensive Metabolic Panel; Future  -     Lipid Panel; Future  -     Hemoglobin A1c; Future  -     PSA Screen; Future  -      CT Chest Low Dose Cancer Screening WO; Future    6. Tobacco abuse  -     Comprehensive Metabolic Panel; Future  -     Lipid Panel; Future  -     Hemoglobin A1c; Future  -     PSA Screen; Future  -      CT Chest Low Dose Cancer Screening WO; Future    7. Seasonal allergic rhinitis due to pollen  -     Comprehensive Metabolic Panel; Future  -     Lipid Panel; Future  -     Hemoglobin A1c; Future  -     PSA Screen; Future  -      CT Chest Low Dose Cancer Screening WO; Future    8. IFG  -     Comprehensive Metabolic Panel; Future  -     Lipid Panel; Future  -     Hemoglobin A1c; Future  -     PSA Screen; Future  -      CT Chest Low Dose Cancer Screening WO; Future    9. Personal history of nicotine dependence  -      CT Chest Low Dose  Cancer Screening WO; Future        Throat issues nothing visualized today July 31, 2023 patient is to monitor closely also discussed getting Dr. Gipson to look if it continues, he will let us know     Sciatica, spinal stenosis lumbar     Mixed hyperlipidemia; continues atorvastatin 20 mg daily    Allergies     Lap choley nov 2022, dr pham ----ct abd / pelvis-- left adrenal ademoma,     Tob/ use and cigars use and etoh - 4 beers/ day ---cautioned June 2023---consider ct chest and / or cxr for screening ?,  Organ to place an order for a screening CT scan of the chest January 31, 2024, also discussed getting a ultrasound abdomen aortic scan once given age risk factors etc.,    Colonoscopy 2022, scheduled to have another one in 2024 several polyps found, Dr. Aleman,    IFG, --hemoglobin A1c 6.0 January 18, 2024    Glaucoma, I lens implants,    Hearing loss, wears hearing aids                Follow Up   Return in about 6 months (around 7/31/2024).  Patient was given instructions and counseling regarding his condition or for health maintenance advice. Please see specific information pulled into the AVS if appropriate.

## 2024-02-20 ENCOUNTER — HOSPITAL ENCOUNTER (OUTPATIENT)
Dept: CT IMAGING | Facility: HOSPITAL | Age: 74
Discharge: HOME | End: 2024-02-20
Admitting: INTERNAL MEDICINE
Payer: MEDICARE

## 2024-02-20 ENCOUNTER — TELEPHONE (OUTPATIENT)
Dept: INTERNAL MEDICINE | Facility: CLINIC | Age: 74
End: 2024-02-20
Payer: MEDICARE

## 2024-02-20 DIAGNOSIS — Z72.0 TOBACCO ABUSE: ICD-10-CM

## 2024-02-20 DIAGNOSIS — R73.01 IFG (IMPAIRED FASTING GLUCOSE): ICD-10-CM

## 2024-02-20 DIAGNOSIS — H90.3 SENSORINEURAL HEARING LOSS, BILATERAL: ICD-10-CM

## 2024-02-20 DIAGNOSIS — M48.062 SPINAL STENOSIS OF LUMBAR REGION WITH NEUROGENIC CLAUDICATION: ICD-10-CM

## 2024-02-20 DIAGNOSIS — Z12.5 SCREENING PSA (PROSTATE SPECIFIC ANTIGEN): ICD-10-CM

## 2024-02-20 DIAGNOSIS — E55.9 VITAMIN D DEFICIENCY: ICD-10-CM

## 2024-02-20 DIAGNOSIS — E78.2 MIXED HYPERLIPIDEMIA: ICD-10-CM

## 2024-02-20 DIAGNOSIS — J30.1 SEASONAL ALLERGIC RHINITIS DUE TO POLLEN: ICD-10-CM

## 2024-02-20 DIAGNOSIS — Z87.891 PERSONAL HISTORY OF NICOTINE DEPENDENCE: ICD-10-CM

## 2024-02-20 PROCEDURE — 71271 CT THORAX LUNG CANCER SCR C-: CPT

## 2024-02-20 NOTE — TELEPHONE ENCOUNTER
Call patient tell him there was no evidence of lung cancer on the CT scan, no other acute cardiopulmonary disease, he has a very tiny right upper lung nodule that is 4 mm in size and the radiologist feels it is benign, they recommended a continued yearly screening test, I will go over this with him in August again if we need to we can do a follow-up CT scan after I see him in August as a precaution but we can discuss that further

## 2024-08-01 ENCOUNTER — LAB (OUTPATIENT)
Dept: INTERNAL MEDICINE | Age: 74
End: 2024-08-01
Payer: MEDICARE

## 2024-08-01 DIAGNOSIS — E78.2 MIXED HYPERLIPIDEMIA: ICD-10-CM

## 2024-08-01 DIAGNOSIS — Z12.5 SCREENING PSA (PROSTATE SPECIFIC ANTIGEN): ICD-10-CM

## 2024-08-01 DIAGNOSIS — E55.9 VITAMIN D DEFICIENCY: ICD-10-CM

## 2024-08-01 DIAGNOSIS — J30.1 SEASONAL ALLERGIC RHINITIS DUE TO POLLEN: ICD-10-CM

## 2024-08-01 DIAGNOSIS — Z72.0 TOBACCO ABUSE: ICD-10-CM

## 2024-08-01 DIAGNOSIS — M48.062 SPINAL STENOSIS OF LUMBAR REGION WITH NEUROGENIC CLAUDICATION: ICD-10-CM

## 2024-08-01 DIAGNOSIS — R73.01 IFG (IMPAIRED FASTING GLUCOSE): ICD-10-CM

## 2024-08-01 DIAGNOSIS — H90.3 SENSORINEURAL HEARING LOSS, BILATERAL: ICD-10-CM

## 2024-08-01 LAB
ALBUMIN SERPL-MCNC: 3.9 G/DL (ref 3.5–5.2)
ALBUMIN/GLOB SERPL: 1.5 G/DL
ALP SERPL-CCNC: 73 U/L (ref 39–117)
ALT SERPL W P-5'-P-CCNC: 24 U/L (ref 1–41)
ANION GAP SERPL CALCULATED.3IONS-SCNC: 11.1 MMOL/L (ref 5–15)
AST SERPL-CCNC: 27 U/L (ref 1–40)
BILIRUB SERPL-MCNC: 0.2 MG/DL (ref 0–1.2)
BUN SERPL-MCNC: 17 MG/DL (ref 8–23)
BUN/CREAT SERPL: 17.2 (ref 7–25)
CALCIUM SPEC-SCNC: 8.8 MG/DL (ref 8.6–10.5)
CHLORIDE SERPL-SCNC: 106 MMOL/L (ref 98–107)
CHOLEST SERPL-MCNC: 148 MG/DL (ref 0–200)
CO2 SERPL-SCNC: 21.9 MMOL/L (ref 22–29)
CREAT SERPL-MCNC: 0.99 MG/DL (ref 0.76–1.27)
EGFRCR SERPLBLD CKD-EPI 2021: 81.4 ML/MIN/1.73
GLOBULIN UR ELPH-MCNC: 2.6 GM/DL
GLUCOSE SERPL-MCNC: 86 MG/DL (ref 65–99)
HBA1C MFR BLD: 5.9 % (ref 4.8–5.6)
HDLC SERPL-MCNC: 50 MG/DL (ref 40–60)
LDLC SERPL CALC-MCNC: 84 MG/DL (ref 0–100)
LDLC/HDLC SERPL: 1.68 {RATIO}
POTASSIUM SERPL-SCNC: 4.3 MMOL/L (ref 3.5–5.2)
PROT SERPL-MCNC: 6.5 G/DL (ref 6–8.5)
PSA SERPL-MCNC: 1.58 NG/ML (ref 0–4)
SODIUM SERPL-SCNC: 139 MMOL/L (ref 136–145)
TRIGL SERPL-MCNC: 71 MG/DL (ref 0–150)
VLDLC SERPL-MCNC: 14 MG/DL (ref 5–40)

## 2024-08-01 PROCEDURE — 80061 LIPID PANEL: CPT | Performed by: INTERNAL MEDICINE

## 2024-08-01 PROCEDURE — 83036 HEMOGLOBIN GLYCOSYLATED A1C: CPT | Performed by: INTERNAL MEDICINE

## 2024-08-01 PROCEDURE — G0103 PSA SCREENING: HCPCS | Performed by: INTERNAL MEDICINE

## 2024-08-01 PROCEDURE — 36415 COLL VENOUS BLD VENIPUNCTURE: CPT | Performed by: INTERNAL MEDICINE

## 2024-08-01 PROCEDURE — 80053 COMPREHEN METABOLIC PANEL: CPT | Performed by: INTERNAL MEDICINE

## 2024-08-05 ENCOUNTER — OFFICE VISIT (OUTPATIENT)
Dept: INTERNAL MEDICINE | Age: 74
End: 2024-08-05
Payer: MEDICARE

## 2024-08-05 VITALS
WEIGHT: 218 LBS | BODY MASS INDEX: 29.53 KG/M2 | DIASTOLIC BLOOD PRESSURE: 74 MMHG | TEMPERATURE: 98.7 F | SYSTOLIC BLOOD PRESSURE: 137 MMHG | HEART RATE: 72 BPM | HEIGHT: 72 IN | OXYGEN SATURATION: 96 %

## 2024-08-05 DIAGNOSIS — R73.01 IFG (IMPAIRED FASTING GLUCOSE): ICD-10-CM

## 2024-08-05 DIAGNOSIS — R52 BODY ACHES: ICD-10-CM

## 2024-08-05 DIAGNOSIS — U07.1 COVID-19 VIRUS INFECTION: Primary | ICD-10-CM

## 2024-08-05 DIAGNOSIS — H90.A31 MIXED CONDUCTIVE AND SENSORINEURAL HEARING LOSS OF RIGHT EAR WITH RESTRICTED HEARING OF LEFT EAR: ICD-10-CM

## 2024-08-05 DIAGNOSIS — E55.9 VITAMIN D DEFICIENCY: ICD-10-CM

## 2024-08-05 DIAGNOSIS — E78.2 MIXED HYPERLIPIDEMIA: ICD-10-CM

## 2024-08-05 DIAGNOSIS — Z72.0 TOBACCO ABUSE: ICD-10-CM

## 2024-08-05 DIAGNOSIS — Z12.5 SCREENING PSA (PROSTATE SPECIFIC ANTIGEN): ICD-10-CM

## 2024-08-05 LAB
EXPIRATION DATE: ABNORMAL
FLUAV AG UPPER RESP QL IA.RAPID: NOT DETECTED
FLUBV AG UPPER RESP QL IA.RAPID: NOT DETECTED
INTERNAL CONTROL: ABNORMAL
Lab: ABNORMAL
SARS-COV-2 AG UPPER RESP QL IA.RAPID: DETECTED

## 2024-08-05 PROCEDURE — 99214 OFFICE O/P EST MOD 30 MIN: CPT | Performed by: INTERNAL MEDICINE

## 2024-08-05 PROCEDURE — 87428 SARSCOV & INF VIR A&B AG IA: CPT | Performed by: INTERNAL MEDICINE

## 2024-08-05 PROCEDURE — G2211 COMPLEX E/M VISIT ADD ON: HCPCS | Performed by: INTERNAL MEDICINE

## 2024-08-05 PROCEDURE — 1170F FXNL STATUS ASSESSED: CPT | Performed by: INTERNAL MEDICINE

## 2024-08-05 NOTE — PROGRESS NOTES
"CHIEF COMPLAINT/ HPI:  Medicare Wellness-subsequent (Wellness , lab follow up. Pt states body aches, sinus and fatigue for several days. )    Initially had fever but that is gone away,          Objective   Vital Signs  Vitals:    08/05/24 1221   BP: 137/74   Pulse: 72   Temp: 98.7 °F (37.1 °C)   SpO2: 96%   Weight: 98.9 kg (218 lb)   Height: 182.9 cm (72.01\")      Body mass index is 29.56 kg/m².  Review of Systems   Neurological:  Positive for headache.    achy, as above  Physical Exam  Constitutional:       General: He is not in acute distress.     Appearance: Normal appearance.   HENT:      Head: Normocephalic.      Mouth/Throat:      Mouth: Mucous membranes are moist.   Eyes:      Conjunctiva/sclera: Conjunctivae normal.      Pupils: Pupils are equal, round, and reactive to light.   Cardiovascular:      Rate and Rhythm: Normal rate and regular rhythm.      Pulses: Normal pulses.      Heart sounds: Normal heart sounds.   Pulmonary:      Effort: Pulmonary effort is normal.      Breath sounds: Normal breath sounds.   Abdominal:      General: Abdomen is flat. Bowel sounds are normal.      Palpations: Abdomen is soft.   Musculoskeletal:         General: No swelling. Normal range of motion.      Cervical back: Neck supple.   Skin:     General: Skin is warm and dry.      Coloration: Skin is not jaundiced.   Neurological:      General: No focal deficit present.      Mental Status: He is alert and oriented to person, place, and time. Mental status is at baseline.   Psychiatric:         Mood and Affect: Mood normal.         Behavior: Behavior normal.         Thought Content: Thought content normal.         Judgment: Judgment normal.        Result Review :   No results found for: \"PROBNP\", \"BNP\"  CMP          1/18/2024    09:06 8/1/2024    14:27   CMP   Glucose 101  86    BUN 13  17    Creatinine 1.05  0.99    EGFR 75.9  81.4    Sodium 141  139    Potassium 5.1  4.3    Chloride 106  106    Calcium 9.3  8.8    Total Protein " 6.9  6.5    Albumin 4.1  3.9    Globulin 2.8  2.6    Total Bilirubin 0.2  0.2    Alkaline Phosphatase 83  73    AST (SGOT) 24  27    ALT (SGPT) 16  24    Albumin/Globulin Ratio 1.5  1.5    BUN/Creatinine Ratio 12.4  17.2    Anion Gap 10.0  11.1      CBC w/diff          1/18/2024    09:06   CBC w/Diff   WBC 9.18    RBC 5.23    Hemoglobin 15.6    Hematocrit 47.7    MCV 91.2    MCH 29.8    MCHC 32.7    RDW 13.8    Platelets 243    Neutrophil Rel % 69.3    Immature Granulocyte Rel % 0.5    Lymphocyte Rel % 15.4    Monocyte Rel % 11.8    Eosinophil Rel % 2.3    Basophil Rel % 0.7       Lipid Panel          1/18/2024    09:06 8/1/2024    14:27   Lipid Panel   Total Cholesterol 153  148    Triglycerides 50  71    HDL Cholesterol 47  50    VLDL Cholesterol 11  14    LDL Cholesterol  95  84    LDL/HDL Ratio 2.04  1.68       Lab Results   Component Value Date    TSH 2.220 07/26/2023      Lab Results   Component Value Date    FREET4 0.98 07/26/2023      A1C Last 3 Results          1/18/2024    09:06 8/1/2024    14:27   HGBA1C Last 3 Results   Hemoglobin A1C 6.00  5.90       PSA          8/1/2024    14:27   PSA   PSA 1.580                     Visit Diagnoses:    ICD-10-CM ICD-9-CM   1. COVID-19 virus infection  U07.1 079.89   2. Body aches  R52 780.96   3. Vitamin D deficiency  E55.9 268.9   4. Mixed conductive and sensorineural hearing loss of right ear with restricted hearing of left ear  H90.A31 389.22   5. IFG  R73.01 790.21   6. Tobacco abuse  Z72.0 305.1   7. Screening PSA (prostate specific antigen)  Z12.5 V76.44   8. Mixed hyperlipidemia  E78.2 272.2       Assessment and Plan   Diagnoses and all orders for this visit:    1. COVID-19 virus infection (Primary)  -     CBC & Differential; Future  -     Comprehensive Metabolic Panel; Future  -     Hemoglobin A1c; Future    2. Body aches  -     POCT SARS-CoV-2 Antigen GORDON + Flu  -     CBC & Differential; Future  -     Comprehensive Metabolic Panel; Future  -     Hemoglobin  A1c; Future    3. Vitamin D deficiency  -     CBC & Differential; Future  -     Comprehensive Metabolic Panel; Future  -     Hemoglobin A1c; Future    4. Mixed conductive and sensorineural hearing loss of right ear with restricted hearing of left ear  -     CBC & Differential; Future  -     Comprehensive Metabolic Panel; Future  -     Hemoglobin A1c; Future    5. IFG  -     CBC & Differential; Future  -     Comprehensive Metabolic Panel; Future  -     Hemoglobin A1c; Future    6. Tobacco abuse  -     CBC & Differential; Future  -     Comprehensive Metabolic Panel; Future  -     Hemoglobin A1c; Future    7. Screening PSA (prostate specific antigen)  -     CBC & Differential; Future  -     Comprehensive Metabolic Panel; Future  -     Hemoglobin A1c; Future    8. Mixed hyperlipidemia  -     CBC & Differential; Future  -     Comprehensive Metabolic Panel; Future  -     Hemoglobin A1c; Future    COVID infection, August 5, 2024 started 2 to 3 days ago, discussed treatment options August 5, 2024--- we will hold off on treatment with Paxlovid for now patient symptoms are not as severe as his wife's,    BMI is >= 25 and <30. (Overweight) The following options were offered after discussion;: weight loss educational material (shared in after visit summary) and exercise counseling/recommendations     Throat issues nothing visualized today July 31, 2023 patient is to monitor closely also discussed getting Dr. Gipson to look if it continues, he will let us know     Sciatica, spinal stenosis lumbar     Mixed hyperlipidemia; continues atorvastatin 20 mg daily    Allergies     [place] valdez nov 2022, dr pham ----ct abd / pelvis-- left adrenal ademoma,     Tob/ use and cigars use and etoh - 4 beers/ day ---cautioned June 2023---consider ct chest and / or cxr for screening ?,  Organ to place an order for a screening CT scan of the chest January 31, 2024, also discussed getting a ultrasound abdomen aortic scan once given age risk factors  etc.,    Colonoscopy 2022, scheduled to have another one in 2024 several polyps found, Dr. Aleman,    IFG, --hemoglobin A1c 5.9 August 1, 2024    Glaucoma, I lens implants,    Hearing loss, wears hearing aids       PSA 1.5 August 1, 2024    Follow Up   Return in about 6 months (around 2/5/2025).  Patient was given instructions and counseling regarding his condition or for health maintenance advice. Please see specific information pulled into the AVS if appropriate.

## 2024-08-19 RX ORDER — ATORVASTATIN CALCIUM 20 MG/1
20 TABLET, FILM COATED ORAL DAILY
Qty: 90 TABLET | Refills: 3 | Status: SHIPPED | OUTPATIENT
Start: 2024-08-19

## 2024-12-18 DIAGNOSIS — N52.9 ERECTILE DYSFUNCTION, UNSPECIFIED ERECTILE DYSFUNCTION TYPE: Primary | ICD-10-CM

## 2024-12-18 RX ORDER — SILDENAFIL 100 MG/1
100 TABLET, FILM COATED ORAL AS NEEDED
Qty: 12 TABLET | Refills: 1 | Status: SHIPPED | OUTPATIENT
Start: 2024-12-18

## 2025-01-31 ENCOUNTER — LAB (OUTPATIENT)
Dept: INTERNAL MEDICINE | Age: 75
End: 2025-01-31
Payer: MEDICARE

## 2025-01-31 DIAGNOSIS — Z72.0 TOBACCO ABUSE: ICD-10-CM

## 2025-01-31 DIAGNOSIS — R73.01 IFG (IMPAIRED FASTING GLUCOSE): ICD-10-CM

## 2025-01-31 DIAGNOSIS — U07.1 COVID-19 VIRUS INFECTION: ICD-10-CM

## 2025-01-31 DIAGNOSIS — Z12.5 SCREENING PSA (PROSTATE SPECIFIC ANTIGEN): ICD-10-CM

## 2025-01-31 DIAGNOSIS — H90.A31 MIXED CONDUCTIVE AND SENSORINEURAL HEARING LOSS OF RIGHT EAR WITH RESTRICTED HEARING OF LEFT EAR: ICD-10-CM

## 2025-01-31 DIAGNOSIS — E55.9 VITAMIN D DEFICIENCY: ICD-10-CM

## 2025-01-31 DIAGNOSIS — E78.2 MIXED HYPERLIPIDEMIA: ICD-10-CM

## 2025-01-31 DIAGNOSIS — R52 BODY ACHES: ICD-10-CM

## 2025-01-31 LAB
ALBUMIN SERPL-MCNC: 3.8 G/DL (ref 3.5–5.2)
ALBUMIN/GLOB SERPL: 1.2 G/DL
ALP SERPL-CCNC: 78 U/L (ref 39–117)
ALT SERPL W P-5'-P-CCNC: 21 U/L (ref 1–41)
ANION GAP SERPL CALCULATED.3IONS-SCNC: 10.4 MMOL/L (ref 5–15)
AST SERPL-CCNC: 29 U/L (ref 1–40)
BASOPHILS # BLD AUTO: 0.07 10*3/MM3 (ref 0–0.2)
BASOPHILS NFR BLD AUTO: 0.8 % (ref 0–1.5)
BILIRUB SERPL-MCNC: 0.3 MG/DL (ref 0–1.2)
BUN SERPL-MCNC: 14 MG/DL (ref 8–23)
BUN/CREAT SERPL: 14.6 (ref 7–25)
CALCIUM SPEC-SCNC: 9 MG/DL (ref 8.6–10.5)
CHLORIDE SERPL-SCNC: 105 MMOL/L (ref 98–107)
CO2 SERPL-SCNC: 23.6 MMOL/L (ref 22–29)
CREAT SERPL-MCNC: 0.96 MG/DL (ref 0.76–1.27)
DEPRECATED RDW RBC AUTO: 51.3 FL (ref 37–54)
EGFRCR SERPLBLD CKD-EPI 2021: 84 ML/MIN/1.73
EOSINOPHIL # BLD AUTO: 0.24 10*3/MM3 (ref 0–0.4)
EOSINOPHIL NFR BLD AUTO: 2.8 % (ref 0.3–6.2)
ERYTHROCYTE [DISTWIDTH] IN BLOOD BY AUTOMATED COUNT: 17.7 % (ref 12.3–15.4)
GLOBULIN UR ELPH-MCNC: 3.3 GM/DL
GLUCOSE SERPL-MCNC: 98 MG/DL (ref 65–99)
HBA1C MFR BLD: 5.2 % (ref 4.8–5.6)
HCT VFR BLD AUTO: 40.5 % (ref 37.5–51)
HGB BLD-MCNC: 12.2 G/DL (ref 13–17.7)
IMM GRANULOCYTES # BLD AUTO: 0.04 10*3/MM3 (ref 0–0.05)
IMM GRANULOCYTES NFR BLD AUTO: 0.5 % (ref 0–0.5)
LYMPHOCYTES # BLD AUTO: 1.76 10*3/MM3 (ref 0.7–3.1)
LYMPHOCYTES NFR BLD AUTO: 20.9 % (ref 19.6–45.3)
MCH RBC QN AUTO: 24.4 PG (ref 26.6–33)
MCHC RBC AUTO-ENTMCNC: 30.1 G/DL (ref 31.5–35.7)
MCV RBC AUTO: 81.2 FL (ref 79–97)
MONOCYTES # BLD AUTO: 1.16 10*3/MM3 (ref 0.1–0.9)
MONOCYTES NFR BLD AUTO: 13.8 % (ref 5–12)
NEUTROPHILS NFR BLD AUTO: 5.16 10*3/MM3 (ref 1.7–7)
NEUTROPHILS NFR BLD AUTO: 61.2 % (ref 42.7–76)
NRBC BLD AUTO-RTO: 0 /100 WBC (ref 0–0.2)
PLATELET # BLD AUTO: 309 10*3/MM3 (ref 140–450)
PMV BLD AUTO: 10.3 FL (ref 6–12)
POTASSIUM SERPL-SCNC: 4.4 MMOL/L (ref 3.5–5.2)
PROT SERPL-MCNC: 7.1 G/DL (ref 6–8.5)
RBC # BLD AUTO: 4.99 10*6/MM3 (ref 4.14–5.8)
SODIUM SERPL-SCNC: 139 MMOL/L (ref 136–145)
WBC NRBC COR # BLD AUTO: 8.43 10*3/MM3 (ref 3.4–10.8)

## 2025-01-31 PROCEDURE — 36415 COLL VENOUS BLD VENIPUNCTURE: CPT | Performed by: INTERNAL MEDICINE

## 2025-01-31 PROCEDURE — 80053 COMPREHEN METABOLIC PANEL: CPT | Performed by: INTERNAL MEDICINE

## 2025-01-31 PROCEDURE — 85025 COMPLETE CBC W/AUTO DIFF WBC: CPT | Performed by: INTERNAL MEDICINE

## 2025-01-31 PROCEDURE — 83036 HEMOGLOBIN GLYCOSYLATED A1C: CPT | Performed by: INTERNAL MEDICINE

## 2025-02-06 ENCOUNTER — OFFICE VISIT (OUTPATIENT)
Dept: INTERNAL MEDICINE | Age: 75
End: 2025-02-06
Payer: MEDICARE

## 2025-02-06 VITALS
TEMPERATURE: 99 F | HEIGHT: 72 IN | SYSTOLIC BLOOD PRESSURE: 136 MMHG | HEART RATE: 68 BPM | BODY MASS INDEX: 29.26 KG/M2 | OXYGEN SATURATION: 94 % | WEIGHT: 216 LBS | DIASTOLIC BLOOD PRESSURE: 62 MMHG

## 2025-02-06 DIAGNOSIS — D50.9 IRON DEFICIENCY ANEMIA, UNSPECIFIED IRON DEFICIENCY ANEMIA TYPE: ICD-10-CM

## 2025-02-06 DIAGNOSIS — H90.A31 MIXED CONDUCTIVE AND SENSORINEURAL HEARING LOSS OF RIGHT EAR WITH RESTRICTED HEARING OF LEFT EAR: ICD-10-CM

## 2025-02-06 DIAGNOSIS — M48.062 SPINAL STENOSIS OF LUMBAR REGION WITH NEUROGENIC CLAUDICATION: ICD-10-CM

## 2025-02-06 DIAGNOSIS — Z12.11 SCREENING FOR COLON CANCER: ICD-10-CM

## 2025-02-06 DIAGNOSIS — R73.01 IFG (IMPAIRED FASTING GLUCOSE): ICD-10-CM

## 2025-02-06 DIAGNOSIS — Z12.2 SCREENING FOR LUNG CANCER: ICD-10-CM

## 2025-02-06 DIAGNOSIS — Z12.5 SCREENING PSA (PROSTATE SPECIFIC ANTIGEN): ICD-10-CM

## 2025-02-06 DIAGNOSIS — Z00.00 MEDICARE ANNUAL WELLNESS VISIT, SUBSEQUENT: Primary | ICD-10-CM

## 2025-02-06 DIAGNOSIS — E55.9 VITAMIN D DEFICIENCY: ICD-10-CM

## 2025-02-06 DIAGNOSIS — E78.2 MIXED HYPERLIPIDEMIA: ICD-10-CM

## 2025-02-06 DIAGNOSIS — H90.3 SENSORINEURAL HEARING LOSS, BILATERAL: ICD-10-CM

## 2025-02-06 DIAGNOSIS — Z87.891 PERSONAL HISTORY OF NICOTINE DEPENDENCE: ICD-10-CM

## 2025-02-06 DIAGNOSIS — Z72.0 TOBACCO ABUSE: ICD-10-CM

## 2025-02-06 PROCEDURE — 1126F AMNT PAIN NOTED NONE PRSNT: CPT | Performed by: INTERNAL MEDICINE

## 2025-02-06 PROCEDURE — G0439 PPPS, SUBSEQ VISIT: HCPCS | Performed by: INTERNAL MEDICINE

## 2025-02-06 PROCEDURE — 99214 OFFICE O/P EST MOD 30 MIN: CPT | Performed by: INTERNAL MEDICINE

## 2025-02-06 PROCEDURE — 1170F FXNL STATUS ASSESSED: CPT | Performed by: INTERNAL MEDICINE

## 2025-02-06 NOTE — PROGRESS NOTES
"Chief Complaint   Patient presents with    Medicare Wellness-subsequent     Wellness visit, lab follow up. Pt is wanting Colonoscopy. Bilateral Sciatica pain. Neuropathy in both legs.    Follow-up with lab work, says he is doing fairly well overall, he is missing part of his hearing aid,  Follow-up with his cholesterol, staying very active, needs his Medicare wellness completed    Objective   Vital Signs  Vitals:    02/06/25 1255   BP: 136/62   BP Location: Left arm   Patient Position: Sitting   Pulse: 68   Temp: 99 °F (37.2 °C)   SpO2: 94%   Weight: 98 kg (216 lb)   Height: 182.9 cm (72.01\")      Body mass index is 29.29 kg/m².  Review of Systems   Constitutional: Negative.    HENT: Negative.     Eyes: Negative.    Respiratory: Negative.     Cardiovascular: Negative.    Gastrointestinal: Negative.    Endocrine: Negative.    Genitourinary: Negative.    Musculoskeletal: Negative.    Allergic/Immunologic: Negative.    Neurological: Negative.    Hematological: Negative.    Psychiatric/Behavioral: Negative.        Physical Exam  Constitutional:       General: He is not in acute distress.     Appearance: Normal appearance. He is obese.   HENT:      Head: Normocephalic.      Mouth/Throat:      Mouth: Mucous membranes are moist.   Eyes:      Conjunctiva/sclera: Conjunctivae normal.      Pupils: Pupils are equal, round, and reactive to light.   Cardiovascular:      Rate and Rhythm: Normal rate and regular rhythm.      Pulses: Normal pulses.      Heart sounds: Normal heart sounds.   Pulmonary:      Effort: Pulmonary effort is normal.      Breath sounds: Normal breath sounds.   Abdominal:      General: Bowel sounds are normal.      Palpations: Abdomen is soft.   Musculoskeletal:         General: No swelling. Normal range of motion.      Cervical back: Neck supple.   Skin:     General: Skin is warm and dry.      Coloration: Skin is not jaundiced.   Neurological:      General: No focal deficit present.      Mental Status: He is " "alert and oriented to person, place, and time. Mental status is at baseline.   Psychiatric:         Mood and Affect: Mood normal.         Behavior: Behavior normal.         Thought Content: Thought content normal.         Judgment: Judgment normal.        Result Review :   No results found for: \"PROBNP\", \"BNP\"  CMP          8/1/2024    14:27 1/31/2025    13:36   CMP   Glucose 86  98    BUN 17  14    Creatinine 0.99  0.96    EGFR 81.4  84.0    Sodium 139  139    Potassium 4.3  4.4    Chloride 106  105    Calcium 8.8  9.0    Total Protein 6.5  7.1    Albumin 3.9  3.8    Globulin 2.6  3.3    Total Bilirubin 0.2  0.3    Alkaline Phosphatase 73  78    AST (SGOT) 27  29    ALT (SGPT) 24  21    Albumin/Globulin Ratio 1.5  1.2    BUN/Creatinine Ratio 17.2  14.6    Anion Gap 11.1  10.4      CBC w/diff          1/31/2025    13:36   CBC w/Diff   WBC 8.43    RBC 4.99    Hemoglobin 12.2    Hematocrit 40.5    MCV 81.2    MCH 24.4    MCHC 30.1    RDW 17.7    Platelets 309    Neutrophil Rel % 61.2    Immature Granulocyte Rel % 0.5    Lymphocyte Rel % 20.9    Monocyte Rel % 13.8    Eosinophil Rel % 2.8    Basophil Rel % 0.8       Lipid Panel          8/1/2024    14:27   Lipid Panel   Total Cholesterol 148    Triglycerides 71    HDL Cholesterol 50    VLDL Cholesterol 14    LDL Cholesterol  84    LDL/HDL Ratio 1.68       Lab Results   Component Value Date    TSH 2.220 07/26/2023      Lab Results   Component Value Date    FREET4 0.98 07/26/2023      A1C Last 3 Results          8/1/2024    14:27 1/31/2025    13:36   HGBA1C Last 3 Results   Hemoglobin A1C 5.90  5.20       PSA          8/1/2024    14:27   PSA   PSA 1.580                     Visit Diagnoses:    ICD-10-CM ICD-9-CM   1. Medicare annual wellness visit, subsequent  Z00.00 V70.0   2. Vitamin D deficiency  E55.9 268.9   3. Mixed conductive and sensorineural hearing loss of right ear with restricted hearing of left ear  H90.A31 389.22   4. Mixed hyperlipidemia  E78.2 272.2   5. " Screening PSA (prostate specific antigen)  Z12.5 V76.44   6. Tobacco abuse  Z72.0 305.1   7. Sensorineural hearing loss, bilateral  H90.3 389.18   8. IFG  R73.01 790.21   9. Spinal stenosis of lumbar region with neurogenic claudication  M48.062 724.03   10. Screening for lung cancer  Z12.2 V76.0   11. Personal history of nicotine dependence  Z87.891 V15.82   12. Iron deficiency anemia, unspecified iron deficiency anemia type  D50.9 280.9   13. Screening for colon cancer  Z12.11 V76.51       Assessment and Plan   Diagnoses and all orders for this visit:    1. Medicare annual wellness visit, subsequent (Primary)  -     Comprehensive Metabolic Panel; Future  -     CBC & Differential; Future  -     Ferritin; Future  -     Iron Profile; Future  -     Lipid Panel; Future  -     PSA Screen; Future    2. Vitamin D deficiency  -     Comprehensive Metabolic Panel; Future  -     CBC & Differential; Future  -     Ferritin; Future  -     Iron Profile; Future  -     Lipid Panel; Future  -     PSA Screen; Future    3. Mixed conductive and sensorineural hearing loss of right ear with restricted hearing of left ear  -     Comprehensive Metabolic Panel; Future  -     CBC & Differential; Future  -     Ferritin; Future  -     Iron Profile; Future  -     Lipid Panel; Future  -     PSA Screen; Future    4. Mixed hyperlipidemia  -     Comprehensive Metabolic Panel; Future  -     CBC & Differential; Future  -     Ferritin; Future  -     Iron Profile; Future  -     Lipid Panel; Future  -     PSA Screen; Future    5. Screening PSA (prostate specific antigen)  -     Comprehensive Metabolic Panel; Future  -     CBC & Differential; Future  -     Ferritin; Future  -     Iron Profile; Future  -     Lipid Panel; Future  -     PSA Screen; Future    6. Tobacco abuse  -     Comprehensive Metabolic Panel; Future  -     CBC & Differential; Future  -     Ferritin; Future  -     Iron Profile; Future  -     Lipid Panel; Future  -     PSA Screen;  Future    7. Sensorineural hearing loss, bilateral  -     Comprehensive Metabolic Panel; Future  -     CBC & Differential; Future  -     Ferritin; Future  -     Iron Profile; Future  -     Lipid Panel; Future  -     PSA Screen; Future    8. IFG  -     Comprehensive Metabolic Panel; Future  -     CBC & Differential; Future  -     Ferritin; Future  -     Iron Profile; Future  -     Lipid Panel; Future  -     PSA Screen; Future    9. Spinal stenosis of lumbar region with neurogenic claudication  -     Comprehensive Metabolic Panel; Future  -     CBC & Differential; Future  -     Ferritin; Future  -     Iron Profile; Future  -     Lipid Panel; Future  -     PSA Screen; Future    10. Screening for lung cancer  -      CT Chest Low Dose Cancer Screening WO; Future  -     Comprehensive Metabolic Panel; Future  -     CBC & Differential; Future  -     Ferritin; Future  -     Iron Profile; Future  -     Lipid Panel; Future  -     PSA Screen; Future    11. Personal history of nicotine dependence  -      CT Chest Low Dose Cancer Screening WO; Future  -     Comprehensive Metabolic Panel; Future  -     CBC & Differential; Future  -     Ferritin; Future  -     Iron Profile; Future  -     Lipid Panel; Future  -     PSA Screen; Future    12. Iron deficiency anemia, unspecified iron deficiency anemia type  -     Ambulatory Referral to General Surgery  -     Comprehensive Metabolic Panel; Future  -     CBC & Differential; Future  -     Ferritin; Future  -     Iron Profile; Future  -     Lipid Panel; Future  -     PSA Screen; Future    13. Screening for colon cancer  -     Ambulatory Referral to General Surgery  -     Comprehensive Metabolic Panel; Future  -     CBC & Differential; Future  -     Ferritin; Future  -     Iron Profile; Future  -     Lipid Panel; Future  -     PSA Screen; Future        Medicare wellness completed February 6, 2025,     Anemia mild new finding January 31, 2025 may need workup, referring back to Dr. Page for  second opinion, EGD possibly colonoscopy,    Hyperlipidemia continues Lipitor 20 mg daily    COVID infection, August 5, 2024 started 2 to 3 days ago, discussed treatment options August 5, 2024--- we will hold off on treatment with Paxlovid for now patient symptoms are not as severe as his wife's,    Sciatica, spinal stenosis lumbar     Lap choley nov 2022, dr pham ----ct abd / pelvis-- left adrenal ademoma,     Tob/ use and cigars use and etoh - 4 beers/ day ---cautioned June 2023--- CT chest screening February 20, 2024==Shows no acute cardiopulmonary process 4 mm right upper lobe nodule likely benign consider screening again in 1 year, referral placed for yearly screening again February 2025,    Colonoscopy 2022, scheduled to have another one in 2024 several polyps found, Dr. Aleman, we will put in a referral due to worsening new anemia February 2025 to GI for consultation for colonoscopy endoscopy, patient to Dr. Page,    IFG, --hemoglobin A1c down to 5.2 January 31, 2025    Glaucoma, I lens implants,    Hearing loss, wears hearing aids       PSA 1.5 August 1, 2024    Follow Up   Return in about 6 months (around 8/6/2025).  Patient was given instructions and counseling regarding his condition or for health maintenance advice. Please see specific information pulled into the AVS if appropriate.

## 2025-02-06 NOTE — PROGRESS NOTES
Subjective   The ABCs of the Annual Wellness Visit  Medicare Wellness Visit      Jose Francisco Gold is a 74 y.o. patient who presents for a Medicare Wellness Visit.    The following portions of the patient's history were reviewed and   updated as appropriate: allergies, current medications, past family history, past medical history, past social history, past surgical history, and problem list.    Compared to one year ago, the patient's physical   health is the same.  Compared to one year ago, the patient's mental   health is the same.    Recent Hospitalizations:  He was not admitted to the hospital during the last year.     Current Medical Providers:  Patient Care Team:  Jamar Sher MD as PCP - General (Internal Medicine)    Outpatient Medications Prior to Visit   Medication Sig Dispense Refill    atorvastatin (LIPITOR) 20 MG tablet TAKE 1 TABLET BY MOUTH DAILY 90 tablet 3    sildenafil (VIAGRA) 100 MG tablet TAKE 1 TABLET BY MOUTH AS NEEDED FOR ERECTILE DYSFUNCTION 12 tablet 1     No facility-administered medications prior to visit.     No opioid medication identified on active medication list. I have reviewed chart for other potential  high risk medication/s and harmful drug interactions in the elderly.      Aspirin is not on active medication list.  Aspirin use is not indicated based on review of current medical condition/s. Risk of harm outweighs potential benefits.  .    Patient Active Problem List   Diagnosis    Allergic rhinitis due to pollen    Mixed hyperlipidemia    Spinal stenosis of lumbar region with neurogenic claudication    Vitamin D deficiency    Screening PSA (prostate specific antigen)    Medicare annual wellness visit, subsequent    IFG    Acute foreign body of right ear canal    Mixed conductive and sensorineural hearing loss of right ear with restricted hearing of left ear    Sensorineural hearing loss, bilateral    Tobacco abuse    Gustatory sweating    Peripheral nerve facial nerve  "paralysis    Vasomotor rhinitis    COVID-19 virus infection     Advance Care Planning Advance Directive is not on file.  ACP discussion was held with the patient during this visit. Patient has an advance directive (not in EMR), copy requested.            Objective   Vitals:    25 1255   BP: 136/62   BP Location: Left arm   Patient Position: Sitting   Pulse: 68   Temp: 99 °F (37.2 °C)   SpO2: 94%   Weight: 98 kg (216 lb)   Height: 182.9 cm (72.01\")   PainSc: 0-No pain       Estimated body mass index is 29.29 kg/m² as calculated from the following:    Height as of this encounter: 182.9 cm (72.01\").    Weight as of this encounter: 98 kg (216 lb).                Does the patient have evidence of cognitive impairment? No  Lab Results   Component Value Date    HGBA1C 5.20 2025                                                                                               Health  Risk Assessment    Smoking Status:  Social History     Tobacco Use   Smoking Status Every Day    Types: Cigars   Smokeless Tobacco Never     Alcohol Consumption:  Social History     Substance and Sexual Activity   Alcohol Use Yes    Comment: 4 beers a day       Fall Risk Screen  STEADI Fall Risk Assessment was completed, and patient is at LOW risk for falls.Assessment completed on:2025    Depression Screening   Little interest or pleasure in doing things? Not at all   Feeling down, depressed, or hopeless? Not at all   PHQ-2 Total Score 0      Health Habits and Functional and Cognitive Screenin/6/2025    12:00 PM   Functional & Cognitive Status   Do you have difficulty preparing food and eating? No   Do you have difficulty bathing yourself, getting dressed or grooming yourself? No   Do you have difficulty using the toilet? No   Do you have difficulty moving around from place to place? No   Do you have trouble with steps or getting out of a bed or a chair? No   Current Diet Well Balanced Diet   Dental Exam Up to date   Eye " Exam Up to date   Exercise (times per week) 0 times per week   Current Exercises Include No Regular Exercise   Do you need help using the phone?  No   Are you deaf or do you have serious difficulty hearing?  Yes   Do you need help to go to places out of walking distance? No   Do you need help shopping? No   Do you need help preparing meals?  No   Do you need help with housework?  No   Do you need help with laundry? No   Do you need help taking your medications? No   Do you need help managing money? No   Do you ever drive or ride in a car without wearing a seat belt? Yes   Have you felt unusual stress, anger or loneliness in the last month? No   Who do you live with? Spouse   If you need help, do you have trouble finding someone available to you? No   Have you been bothered in the last four weeks by sexual problems? No   Do you have difficulty concentrating, remembering or making decisions? No           Age-appropriate Screening Schedule:  Refer to the list below for future screening recommendations based on patient's age, sex and/or medical conditions. Orders for these recommended tests are listed in the plan section. The patient has been provided with a written plan.    Health Maintenance List  Health Maintenance   Topic Date Due    COLORECTAL CANCER SCREENING  Never done    Pneumococcal Vaccine 65+ (1 of 2 - PCV) Never done    ZOSTER VACCINE (1 of 2) Never done    INFLUENZA VACCINE  Never done    ANNUAL WELLNESS VISIT  07/31/2024    COVID-19 Vaccine (3 - 2024-25 season) 09/01/2024    LIPID PANEL  08/01/2025    BMI FOLLOWUP  08/05/2025    TDAP/TD VACCINES (2 - Td or Tdap) 06/27/2034    HEPATITIS C SCREENING  Completed    AAA SCREEN ONCE  Completed                                                                                                                                                CMS Preventative Services Quick Reference  Risk Factors Identified During Encounter  Tobacco Use/Dependance Risk (use dotphrase  .tobaccocessation for documentation)    The above risks/problems have been discussed with the patient.  Pertinent information has been shared with the patient in the After Visit Summary.  An After Visit Summary and PPPS were made available to the patient.    Follow Up:   Next Medicare Wellness visit to be scheduled in 1 year.     Assessment & Plan         Follow Up:   No follow-ups on file.

## 2025-02-13 ENCOUNTER — OFFICE VISIT (OUTPATIENT)
Dept: OTOLARYNGOLOGY | Facility: CLINIC | Age: 75
End: 2025-02-13
Payer: MEDICARE

## 2025-02-13 VITALS
SYSTOLIC BLOOD PRESSURE: 129 MMHG | WEIGHT: 222 LBS | TEMPERATURE: 96.9 F | BODY MASS INDEX: 30.07 KG/M2 | HEIGHT: 72 IN | HEART RATE: 90 BPM | DIASTOLIC BLOOD PRESSURE: 60 MMHG

## 2025-02-13 DIAGNOSIS — H62.42 OTOMYCOSIS OF LEFT EAR: Primary | ICD-10-CM

## 2025-02-13 DIAGNOSIS — B36.9 OTOMYCOSIS OF LEFT EAR: ICD-10-CM

## 2025-02-13 DIAGNOSIS — H90.3 SENSORINEURAL HEARING LOSS, BILATERAL: ICD-10-CM

## 2025-02-13 DIAGNOSIS — H62.42 OTOMYCOSIS OF LEFT EAR: ICD-10-CM

## 2025-02-13 DIAGNOSIS — B36.9 OTOMYCOSIS OF LEFT EAR: Primary | ICD-10-CM

## 2025-02-13 RX ORDER — CLOTRIMAZOLE 1 G/ML
SOLUTION TOPICAL
Qty: 15 ML | Refills: 1 | Status: SHIPPED | OUTPATIENT
Start: 2025-02-13 | End: 2025-02-13 | Stop reason: SDUPTHER

## 2025-02-13 RX ORDER — CLOTRIMAZOLE 1 G/ML
SOLUTION TOPICAL
Qty: 15 ML | Refills: 1 | Status: SHIPPED | OUTPATIENT
Start: 2025-02-13

## 2025-02-13 NOTE — PATIENT INSTRUCTIONS
1.  Right otomycosis otherwise cleaned most of it under microscope.  2.  I am going to start patient on Lotrimin solution to the right ear.  3.  Bilateral sensorineural hearing loss and we will try to obtain audiogram when he comes back and his infection is cleared.

## 2025-02-13 NOTE — PROGRESS NOTES
"Patient Name: Jose Francisco Gold   Visit Date: 02/13/2025   Patient ID: [id number]  Provider: Cl Gipson MD    Sex: male  Location: [facility]   YOB: 1950  Location Address: [address] [place] [address], [address] [place] [address],?[place]?[id number]    Primary Care Provider Jamar Sher MD  Location Phone: [phone]    Referring Provider: No ref. provider found        Chief Complaint  6 month follow up, Hearing Loss, and vasomotor rhinitis    History of Present Illness  Jose Francisco Gold is a 74 y.o. male who presents to [facility] for 6 month follow up, Hearing Loss, and vasomotor rhinitis.   Patient had been seen by Dr. Sorin Sher and subsequently referred for hearing loss and vasomotor rhinitis.  The patient complains mainly of his hearing trouble on the right side and currently he has left hearing aid out for repair and he is not able to hear very well out of the left side due to that.    Past Medical History:   Diagnosis Date    High cholesterol        History reviewed. No pertinent surgical history.      Current Outpatient Medications:     atorvastatin (LIPITOR) 20 MG tablet, TAKE 1 TABLET BY MOUTH DAILY, Disp: 90 tablet, Rfl: 3    sildenafil (VIAGRA) 100 MG tablet, TAKE 1 TABLET BY MOUTH AS NEEDED FOR ERECTILE DYSFUNCTION, Disp: 12 tablet, Rfl: 1    clotrimazole (LOTRIMIN) 1 % external solution, 3-4 drops in both ear twice a day for 1 week, Disp: 15 mL, Rfl: 1     No Known Allergies    Social History     Tobacco Use    Smoking status: Every Day     Types: Cigars    Smokeless tobacco: Never   Vaping Use    Vaping status: Never Used   Substance Use Topics    Alcohol use: Yes     Comment: 4 beers a day    Drug use: Never        Objective     Vital Signs:   Vitals:    02/13/25 1622   BP: 129/60   Pulse: 90   Temp: 96.9 °F (36.1 °C)   Weight: 101 kg (222 lb)   Height: 182.9 cm (72.01\")       Tobacco Use: High Risk (2/13/2025)    Patient History     Smoking " Tobacco Use: Every Day     Smokeless Tobacco Use: Never     Passive Exposure: Not on file         Physical Exam    Constitutional   Appearance  well developed, well-nourished, alert and in no acute distress, voice clear and strong    Head   Inspection  no deformities or lesions      Face   Inspection  no facial lesions; House-Brackmann I/VI bilaterally   Palpation  no TMJ crepitus nor  muscle tenderness bilaterally     Eyes/Vision   Visual Fields  extraocular movements are intact, no spontaneous or gaze-induced nystagmus  Conjunctivae  clear   Sclerae  clear   Pupils and Irises  pupils equal, round, and reactive to light.   Nystagmus  not present     Ears, Nose, Mouth and Throat  Ears  External Ears  appearance within normal limits, no lesions present   Otoscopic Examination  Left TM and EAC is clear  Right ear canal with epithelial debris wrapping around the entire tympanic membrane with what looks like fungus material that is yellowish covering the entire eardrum and ear canal.  Hearing  intact to conversational voice both ears   Tunning fork testing    Rinne:  Pickett:    Nose  External Nose  appearance normal   Intranasal Exam  mucosa within normal limits, vestibules normal, no intranasal lesions present, septum midline, sinuses non tender to percussion   Modified Karen Test:    Oral Cavity  Oral Mucosa  oral mucosa normal without pallor or cyanosis   Lips  lip appearance normal   Teeth  normal dentition for age   Gums  gums pink, non-swollen, no bleeding present   Tongue  tongue appearance normal; normal mobility   Palate  hard palate normal, soft palate appearance normal with symmetric mobility     Throat  Oropharynx  no inflammation or lesions present, tonsils within normal limits   Hypopharynx  appearance within normal limits   Larynx  voice normal     Neck  Inspection/Palpation  normal appearance, no masses or tenderness, trachea midline; thyroid size normal, nontender, no nodules or masses present  "on palpation     Lymphatic  Neck  no lymphadenopathy present   Supraclavicular Nodes  no lymphadenopathy present   Preauricular Nodes  no lymphadenopathy present     Respiratory  Respiratory Effort  breathing unlabored   Inspection of Chest  normal appearance, no retractions     Musculoskeletal   Cervical back: Normal range of motion and neck supple.      Skin and Subcutaneous Tissue  General Inspection  Regarding face and neck - there are no rashes present, no lesions present, and no areas of discoloration     Neurologic  Cranial Nerves  cranial nerves II-XII are grossly intact bilaterally   Gait and Station  normal gait, able to stand without diffculty    Psychiatric  Judgement and Insight  judgment and insight intact   Mood and Affect  mood normal, affect appropriate       RESULTS REVIEWED    I have reviewed the following information:   [x]  Previous Internal Note  []  Previous External Note:   [x]  Ordered Tests & Results:      Pathology: No results found for: \"MICRO\"    TSH   Date Value Ref Range Status   07/26/2023 2.220 0.270 - 4.200 uIU/mL Final     Free T4   Date Value Ref Range Status   07/26/2023 0.98 0.93 - 1.70 ng/dL Final     Comment:     T4 results may be falsely increased if patient taking Biotin.     Calcium   Date Value Ref Range Status   01/31/2025 9.0 8.6 - 10.5 mg/dL Final   01/13/2022 8.7 8.6 - 10.3 mg/dL Final     25 Hydroxy, Vitamin D   Date Value Ref Range Status   07/26/2023 26.5 (L) 30.0 - 100.0 ng/ml Final       No Images in the past 120 days found..    I have discussed the interpretation of the above results with the patient.    Ear Cerumen Removal    Date/Time: 2/13/2025 5:03 PM    Performed by: Cl Gipson MD  Authorized by: Cl Gipson MD    Anesthesia:  Local Anesthetic: none  Location details: right ear  Patient tolerance: patient tolerated the procedure well with no immediate complications  Comments: Under the microscope right ear was examined and cleaned all the epithelial " debris along with fungal elements on top of the debris removed in its entirety using a pick alligator forceps and suction.  Otherwise right tympanic membrane remains intact.  No retraction is noted.  Procedure type: instrumentation, alligator forceps, curved hook, suction   Sedation:  Patient sedated: no                  Assessment and Plan   Diagnoses and all orders for this visit:    1. Otomycosis of left ear (Primary)  -     clotrimazole (LOTRIMIN) 1 % external solution; 3-4 drops in both ear twice a day for 1 week  Dispense: 15 mL; Refill: 1    2. Sensorineural hearing loss, bilateral    Other orders  -     Ear Cerumen Removal        (B36.9,  H62.42) Otomycosis of left ear - Plan: clotrimazole (LOTRIMIN) 1 % external solution    (H90.3) Sensorineural hearing loss, bilateral     Jose Francisco Gold  reports that he has been smoking cigars. He has never used smokeless tobacco.         Plan:  Patient Instructions   1.  Right otomycosis otherwise cleaned most of it under microscope.  2.  I am going to start patient on Lotrimin solution to the right ear.  3.  Bilateral sensorineural hearing loss and we will try to obtain audiogram when he comes back and his infection is cleared.        Follow Up   Return in about 1 month (around 3/13/2025), or Follow-up 1 month with Jennifer Charles..  Patient was given instructions and counseling regarding his condition or for health maintenance advice. Please see specific information pulled into the AVS if appropriate.

## 2025-02-21 ENCOUNTER — HOSPITAL ENCOUNTER (OUTPATIENT)
Dept: CT IMAGING | Facility: HOSPITAL | Age: 75
Discharge: HOME | End: 2025-02-21
Payer: MEDICARE

## 2025-02-21 DIAGNOSIS — Z87.891 PERSONAL HISTORY OF NICOTINE DEPENDENCE: ICD-10-CM

## 2025-02-21 DIAGNOSIS — Z12.2 SCREENING FOR LUNG CANCER: ICD-10-CM

## 2025-02-21 PROCEDURE — 71271 CT THORAX LUNG CANCER SCR C-: CPT

## 2025-03-13 ENCOUNTER — TELEPHONE (OUTPATIENT)
Dept: OTOLARYNGOLOGY | Facility: CLINIC | Age: 75
End: 2025-03-13

## 2025-03-13 NOTE — TELEPHONE ENCOUNTER
Could not leave message do to voicemail not set up. Appointment has been cancelled for today with Jennifer and rescheduled to 03/19/2025 at 1:45 pm will try to call again later on this morning

## 2025-03-20 ENCOUNTER — OFFICE VISIT (OUTPATIENT)
Dept: SURGERY | Facility: CLINIC | Age: 75
End: 2025-03-20
Payer: MEDICARE

## 2025-03-20 ENCOUNTER — PREP FOR SURGERY (OUTPATIENT)
Dept: OTHER | Facility: HOSPITAL | Age: 75
End: 2025-03-20
Payer: MEDICARE

## 2025-03-20 VITALS — WEIGHT: 214.73 LBS | BODY MASS INDEX: 29.08 KG/M2 | HEIGHT: 72 IN | RESPIRATION RATE: 14 BRPM

## 2025-03-20 DIAGNOSIS — Z86.0100 HISTORY OF COLONIC POLYPS: ICD-10-CM

## 2025-03-20 DIAGNOSIS — Z12.11 SCREENING FOR MALIGNANT NEOPLASM OF COLON: Primary | ICD-10-CM

## 2025-03-20 RX ORDER — SODIUM CHLORIDE 0.9 % (FLUSH) 0.9 %
10 SYRINGE (ML) INJECTION AS NEEDED
OUTPATIENT
Start: 2025-03-20

## 2025-03-20 RX ORDER — SODIUM CHLORIDE 0.9 % (FLUSH) 0.9 %
3 SYRINGE (ML) INJECTION EVERY 12 HOURS SCHEDULED
OUTPATIENT
Start: 2025-03-20

## 2025-03-20 RX ORDER — PEG-3350, SODIUM SULFATE, SODIUM CHLORIDE, POTASSIUM CHLORIDE, SODIUM ASCORBATE AND ASCORBIC ACID 7.5-2.691G
1000 KIT ORAL ONCE
Qty: 1000 ML | Refills: 0 | Status: SHIPPED | OUTPATIENT
Start: 2025-03-20 | End: 2025-03-20

## 2025-03-20 RX ORDER — SODIUM CHLORIDE 9 MG/ML
40 INJECTION, SOLUTION INTRAVENOUS AS NEEDED
OUTPATIENT
Start: 2025-03-20

## 2025-03-20 NOTE — PROGRESS NOTES
"Chief Complaint: Colonoscopy    Subjective      Colonoscopy consultation       History of Present Illness  Jose Francisco Gold is a 74 y.o. male presents to [facility] for colonoscopy consultation.    Patient presents today on referral from Dr. Sorin Sher for colonoscopy consultation.  Patient denies any abdominal pain, change in bowel habit, or rectal bleeding.  Denies any family history of colorectal cancer.  Admits to history of colonic polyps.    Denies BOBO.  Denies any cardiac issues.  Denies taking any GLP-1 receptors.    3/22: Colonoscopy ([place]): Ascending - mixed hyperplastic/adenomatous; Sigmoid - 3 tubular adenomas; Rectum - many hyperplastic.    Objective     Past Medical History:   Diagnosis Date    High cholesterol        History reviewed. No pertinent surgical history.    Outpatient Medications Marked as Taking for the 3/20/25 encounter (Office Visit) with Bubba April, APRN   Medication Sig Dispense Refill    atorvastatin (LIPITOR) 20 MG tablet TAKE 1 TABLET BY MOUTH DAILY 90 tablet 3    clotrimazole (LOTRIMIN) 1 % external solution 3-4 drops in both ear twice a day for 1 week 15 mL 1    sildenafil (VIAGRA) 100 MG tablet TAKE 1 TABLET BY MOUTH AS NEEDED FOR ERECTILE DYSFUNCTION 12 tablet 1       No Known Allergies     History reviewed. No pertinent family history.    Social History     Socioeconomic History    Marital status:    Tobacco Use    Smoking status: Every Day     Types: Cigars    Smokeless tobacco: Never   Vaping Use    Vaping status: Never Used   Substance and Sexual Activity    Alcohol use: Yes     Comment: 4 beers a day    Drug use: Never    Sexual activity: Defer       Review of Systems   Constitutional:  Negative for chills and fever.   Gastrointestinal:  Negative for abdominal distention, abdominal pain, anal bleeding, blood in stool, constipation, diarrhea and rectal pain.        Vital Signs:   Resp 14   Ht 182.9 cm (72.01\")   Wt 97.4 kg " (214 lb 11.7 oz)   BMI 29.12 kg/m²      Physical Exam  Vitals and nursing note reviewed.   Constitutional:       General: He is not in acute distress.     Appearance: He is obese. He is not ill-appearing.   HENT:      Head: Normocephalic and atraumatic.   Cardiovascular:      Rate and Rhythm: Normal rate.   Pulmonary:      Effort: Pulmonary effort is normal.      Breath sounds: No stridor.   Abdominal:      Palpations: Abdomen is soft.      Tenderness: There is no guarding.   Musculoskeletal:         General: No deformity. Normal range of motion.   Skin:     General: Skin is warm and dry.      Coloration: Skin is not jaundiced.   Neurological:      General: No focal deficit present.      Mental Status: He is alert and oriented to person, place, and time.   Psychiatric:         Mood and Affect: Mood normal.         Thought Content: Thought content normal.          Result Review :          []  Laboratory  []  Radiology  []  Pathology  []  Microbiology  []  EKG/Telemetry   []  Cardiology/Vascular   []  Old records  I spent 15 minutes caring for Jose Francisco on this date of service. This time includes time spent by me in the following activities: reviewing tests, obtaining and/or reviewing a separately obtained history, performing a medically appropriate examination and/or evaluation, ordering medications, tests, or procedures, and documenting information in the medical record        Assessment and Plan    Diagnoses and all orders for this visit:    1. Screening for malignant neoplasm of colon (Primary)    2. History of colonic polyps    Other orders  -     PEG-KCl-NaCl-NaSulf-Na Asc-C (MOVIPREP) 100 g reconstituted solution powder; Take 1,000 mL by mouth 1 (One) Time for 1 dose.  Dispense: 1000 mL; Refill: 0        Follow Up   Return for Schedule colonoscopy with Dr. Fishman on 4/20/[facility]25 [facility].    Hospital arrival time: 11:30    Possible risks/complications, benefits, and alternatives to surgical or invasive  procedures have been explained to patient and/or legal guardian.    Patient has been evaluated and can tolerate anesthesia and/or sedation. Risks, benefits, and alternatives to anesthesia and sedation have been explained to the patient and/or legal guardian. Patient verbalizes understanding and is willing to proceed with the above plan.     Patient was given instructions and counseling regarding his condition or for health maintenance advice. Please see specific information pulled into the AVS if appropriate.     As always, it has been a pleasure to participate in your patient's care. Please call with questions or concerns.

## 2025-03-24 NOTE — PROGRESS NOTES
"Patient Name: Jose Francisco Gold   Visit Date: 03/13/2025   Patient ID: [id number]  Provider: IRIS Ledesma    Sex: male  [place] [facility]   YOB: 1950  Location Address: [address] [place] [address], [address] [place] [address],?[place]?[id number]    Primary Care Provider Jamar Sher MD  Location Phone: [phone]    Referring Provider: No ref. provider found        Chief Complaint  1 MO Follow-up and recheck ears     History of Present Illness  Jose Francisco Gold is a 74 y.o. male who presents to [facility] for 1 MO Follow-up and recheck ears .   Patient has previously been seen by Dr. Gipson with last appointment on 2/13/2025.  Patient has a history of hearing loss and vasomotor rhinitis.   From his last visit patient had otomycosis and epithelial debris completely covering the right TM and wrapping around the right EAC.  Patient was prescribed clotrimazole 1% 4 drops twice a day x 1 week.      3/25/25  Patient presents today for 1 month follow-up with possible audiogram.  Denies any discharge or complaints from the ear.  Audiogram results from 3/25/2025 are as follows:  SRT of 70 on the right and 75 on the left.  Word discrimination scores 68% on the right and 47% on the left.  Tympanograms type a bilaterally.  Severe to profound sensorineural hearing loss bilaterally.    Vital Signs:  Vitals:    03/25/25 1543   BP: 139/60   Pulse: 84   Temp: 97.5 °F (36.4 °C)   TempSrc: Temporal   Weight: 98.9 kg (218 lb)   Height: 182.9 cm (72\")        Past Medical History:   Diagnosis Date    High cholesterol     HL (hearing loss)        Past Surgical History:   Procedure Laterality Date    CYST REMOVAL      GALLBLADDER SURGERY           Current Outpatient Medications:     atorvastatin (LIPITOR) 20 MG tablet, TAKE 1 TABLET BY MOUTH DAILY, Disp: 90 tablet, Rfl: 3    sildenafil (VIAGRA) 100 MG tablet, TAKE 1 TABLET BY MOUTH AS NEEDED FOR ERECTILE DYSFUNCTION, Disp: 12 " tablet, Rfl: 1    clotrimazole (LOTRIMIN) 1 % external solution, 3-4 drops in both ear twice a day for 1 week (Patient not taking: Reported on 3/25/2025), Disp: 15 mL, Rfl: 1    PEG-3350/Electrolytes/Ascorbat 100 g reconstituted solution powder, , Disp: , Rfl:      No Known Allergies    Social History     Tobacco Use    Smoking status: Every Day     Types: Cigars     Passive exposure: Current    Smokeless tobacco: Never   Vaping Use    Vaping status: Never Used   Substance Use Topics    Alcohol use: Yes     Comment: 4 beers a day    Drug use: Never        Objective     Tobacco Use: High Risk (3/25/2025)    Patient History     Smoking Tobacco Use: Every Day     Smokeless Tobacco Use: Never     Passive Exposure: Current         Physical Exam    Constitutional   Appearance  well developed, well-nourished, alert and in no acute distress, voice clear and strong    Head   Inspection  no deformities or lesions, atraumatic    Face   Inspection  no facial lesions; House-Brackmann I/VI bilaterally   Palpation  no TMJ crepitus nor  muscle tenderness bilaterally     Eyes/Vision   Visual Fields  extraocular movements are intact, no spontaneous or gaze-induced nystagmus  Conjunctivae  clear   Sclerae  clear   Pupils and Irises  pupils equal, round, and reactive to light.   Nystagmus  not present     Ears, Nose, Mouth and Throat  Ears  External Ears  Auricles appearance within normal limits, no lesions present   Otoscopic Examination  Right cerumen impaction  tympanic membrane appearance within normal limits bilaterally without perforations, well-aerated middle ears without evidence of effusion  Hearing  intact to conversational voice both ears   Tunning fork testing    Rinne:  Pickett:    Nose  External Nose  appearance normal   Intranasal Exam  mucosa within normal limits, vestibules normal, no intranasal lesions present, septum midline, sinuses non tender to percussion   Modified Karen Test:    Oral Cavity  Oral  "Mucosa  oral mucosa normal without pallor or cyanosis   Stensen's and Warthin's ducts are productive and patent with clear saliva  Lips  lip appearance normal   Teeth  normal dentition for age   Gums  gums pink, non-swollen, no bleeding present   Tongue  tongue appearance normal; normal mobility   Palate  hard palate normal, soft palate appearance normal with symmetric mobility     Throat  Oropharynx  no inflammation or lesions present  Tonsils  Bilateral tonsils unremarkable  Hypopharynx  appearance within normal limits   Larynx  voice normal     Neck  Inspection/Palpation  normal appearance, no masses or tenderness, trachea midline; thyroid size normal, nontender, no nodules or masses present on palpation     Lymphatic  Neck  no lymphadenopathy present   Supraclavicular Nodes  no lymphadenopathy present   Preauricular Nodes  no lymphadenopathy present     Respiratory  Respiratory Effort  breathing unlabored   Inspection of Chest  normal appearance, no retractions     Musculoskeletal   Cervical back: Normal range of motion and neck supple.      Skin and Subcutaneous Tissue  General Inspection  Regarding face and neck - there are no rashes present, no lesions present, and no areas of discoloration     Neurologic  Cranial Nerves  Alert and oriented x3  cranial nerves II-XII are grossly intact bilaterally   Gait and Station  normal gait, able to stand without diffculty    Psychiatric  Judgement and Insight  judgment and insight intact   Mood and Affect  mood normal, affect appropriate       RESULTS REVIEWED    I have reviewed the following information:   [x]  Previous Internal Note  []  Previous External Note:   [x]  Ordered Tests & Results:      Pathology: No results found for: \"MICRO\"    TSH   Date Value Ref Range Status   07/26/2023 2.220 0.270 - 4.200 uIU/mL Final     Free T4   Date Value Ref Range Status   07/26/2023 0.98 0.93 - 1.70 ng/dL Final     Comment:     T4 results may be falsely increased if patient " taking Biotin.     Calcium   Date Value Ref Range Status   01/31/2025 9.0 8.6 - 10.5 mg/dL Final   01/13/2022 8.7 8.6 - 10.3 mg/dL Final     25 Hydroxy, Vitamin D   Date Value Ref Range Status   07/26/2023 26.5 (L) 30.0 - 100.0 ng/ml Final       CT Chest Low Dose Cancer Screening WO  Result Date: 2/24/2025  Impression: 1.Multiple stable pulmonary nodules favored represent granulomas. 2.1.4 cm left adrenal nodule favored represent an adenoma Recommendation: Continue annual screening with LDCT Lung Rads Assessment: Lung-RADS L2 - Benign appearance or <1% chance of malignancy. Electronically Signed: Braulio Posadas MD  2/24/2025 2:24 PM EST  Workstation ID: [id number]      I have discussed the interpretation of the above results with the patient.    Ear Cerumen Removal    Date/Time: 3/25/2025 5:07 PM    Performed by: Jennifer Charles APRN  Authorized by: Jennifer Charles APRN    Anesthesia:  Local Anesthetic: none  Location details: right ear  Patient tolerance: patient tolerated the procedure well with no immediate complications  Comments: Right cerumen impaction removed under microscopy with use of a curette.  Bilateral EACs and TMs unremarkable.  Procedure type: instrumentation, curette   Sedation:  Patient sedated: no              Assessment and Plan   Diagnoses and all orders for this visit:    1. Sensorineural hearing loss, bilateral (Primary)  -     Audiometry With Tympanometry    2. Sensorineural hearing loss (SNHL) of right ear with restricted hearing of left ear  -     Audiometry With Tympanometry    3. Tobacco abuse    4. Impacted cerumen of right ear  -     Ear Cerumen Removal          (H90.3) Sensorineural hearing loss, bilateral - Plan: Audiometry With Tympanometry    (H90.A21) Sensorineural hearing loss (SNHL) of right ear with restricted hearing of left ear - Plan: Audiometry With Tympanometry    (Z72.0) Tobacco abuse    (H61.21) Impacted cerumen of right ear - Plan: Ear Cerumen Removal     Jose Francisco Gold   reports that he has been smoking cigars. He has been exposed to tobacco smoke. He has never used smokeless tobacco.     I have educated him on the risk of diseases from using tobacco products such as Cancer, COPD & Heart Disease.     I advised him to quit.  I spent 3 minutes counseling the patient.       Plan:  Patient Instructions   1.  Right otomycosis has resolved.  Smallest amount of cerumen removed under microscopy today.  2.  Repeat audiogram from today fairly stable.  Recommend patient use noise protection for any motorized equipment.  Recommend patient continue use of hearing aids.  Recommend patient continue to keep ears clean and dry.  3.  I will see patient back in 6 months for reevaluation and then then 1 year with reeval and audio.  4.  Recommend patient quit smoking due to risk of cancer, COPD, heart disease, chronic sinus inflammation.      Follow Up   Return in about 1 year (around 3/25/2026), or 1 year with audio.  Patient was given instructions and counseling regarding his condition or for health maintenance advice. Please see specific information pulled into the AVS if appropriate.      All or a portion of this Note was dictated utilizing Dragon Dictation.

## 2025-03-25 ENCOUNTER — PROCEDURE VISIT (OUTPATIENT)
Dept: OTOLARYNGOLOGY | Facility: CLINIC | Age: 75
End: 2025-03-25
Payer: MEDICARE

## 2025-03-25 ENCOUNTER — OFFICE VISIT (OUTPATIENT)
Dept: OTOLARYNGOLOGY | Facility: CLINIC | Age: 75
End: 2025-03-25
Payer: MEDICARE

## 2025-03-25 VITALS
DIASTOLIC BLOOD PRESSURE: 60 MMHG | BODY MASS INDEX: 29.53 KG/M2 | WEIGHT: 218 LBS | TEMPERATURE: 97.5 F | HEART RATE: 84 BPM | HEIGHT: 72 IN | SYSTOLIC BLOOD PRESSURE: 139 MMHG

## 2025-03-25 DIAGNOSIS — H90.A32 MIXED CONDUCTIVE AND SENSORINEURAL HEARING LOSS OF LEFT EAR WITH RESTRICTED HEARING OF RIGHT EAR: ICD-10-CM

## 2025-03-25 DIAGNOSIS — H90.3 SENSORINEURAL HEARING LOSS, BILATERAL: Primary | ICD-10-CM

## 2025-03-25 DIAGNOSIS — H90.A31 MIXED CONDUCTIVE AND SENSORINEURAL HEARING LOSS OF RIGHT EAR WITH RESTRICTED HEARING OF LEFT EAR: Primary | ICD-10-CM

## 2025-03-25 DIAGNOSIS — Z72.0 TOBACCO ABUSE: ICD-10-CM

## 2025-03-25 DIAGNOSIS — H61.21 IMPACTED CERUMEN OF RIGHT EAR: ICD-10-CM

## 2025-03-25 DIAGNOSIS — H90.A21 SENSORINEURAL HEARING LOSS (SNHL) OF RIGHT EAR WITH RESTRICTED HEARING OF LEFT EAR: ICD-10-CM

## 2025-03-25 PROCEDURE — 99213 OFFICE O/P EST LOW 20 MIN: CPT

## 2025-03-25 PROCEDURE — 92567 TYMPANOMETRY: CPT | Performed by: AUDIOLOGIST

## 2025-03-25 PROCEDURE — 69210 REMOVE IMPACTED EAR WAX UNI: CPT

## 2025-03-25 PROCEDURE — 92557 COMPREHENSIVE HEARING TEST: CPT | Performed by: AUDIOLOGIST

## 2025-03-25 RX ORDER — POLYETHYLENE GLYCOL 3350, SODIUM SULFATE, SODIUM CHLORIDE, POTASSIUM CHLORIDE, SODIUM ASCORBATE, AND ASCORBIC ACID 7.5-2.691G
KIT ORAL
COMMUNITY
Start: 2025-03-20

## 2025-03-25 NOTE — PATIENT INSTRUCTIONS
1.  Right otomycosis has resolved.  Smallest amount of cerumen removed under microscopy today.  2.  Repeat audiogram from today fairly stable.  Recommend patient use noise protection for any motorized equipment.  Recommend patient continue use of hearing aids.  Recommend patient continue to keep ears clean and dry.  3.  I will see patient back in 6 months for reevaluation and then then 1 year with reeval and audio.  4.  Recommend patient quit smoking due to risk of cancer, COPD, heart disease, chronic sinus inflammation.

## 2025-04-23 ENCOUNTER — ANESTHESIA EVENT (OUTPATIENT)
Dept: GASTROENTEROLOGY | Facility: HOSPITAL | Age: 75
End: 2025-04-23
Payer: MEDICARE

## 2025-04-23 NOTE — ANESTHESIA PREPROCEDURE EVALUATION
Anesthesia Evaluation     Patient summary reviewed   NPO Solid Status: > 8 hours  NPO Liquid Status: > 4 hours           Airway   Mallampati: II  TM distance: >3 FB  Neck ROM: full  No difficulty expected  Dental    (+) poor dentition and upper dentures    Comment: Upper dentures removed prior to procedure     Pulmonary     breath sounds clear to auscultation  (+) a smoker (current) Current, Abstained day of surgery, cigars,  Cardiovascular - normal exam  Exercise tolerance: good (4-7 METS)    Rhythm: regular  Rate: normal    (+) hyperlipidemia      Neuro/Psych  (+) numbness  GI/Hepatic/Renal/Endo      Musculoskeletal     Abdominal    Substance History   (+) alcohol use (4 beers/day)     OB/GYN          Other        ROS/Med Hx Other: Screening, hx polyps    No EKG on file      Phys Exam Other: Full beard                   Anesthesia Plan    ASA 2     general   total IV anesthesia  (Total IV Anesthesia    Patient understands anesthesia not responsible for dental damage.        Discussed risks with pt including aspiration, allergic reactions, apnea, advanced airway placement. Pt verbalized understanding. All questions answered.   )  intravenous induction     Anesthetic plan, risks, benefits, and alternatives have been provided, discussed and informed consent has been obtained with: patient.  Pre-procedure education provided  Plan discussed with CRNA.        CODE STATUS:

## 2025-04-24 ENCOUNTER — ANESTHESIA (OUTPATIENT)
Dept: GASTROENTEROLOGY | Facility: HOSPITAL | Age: 75
End: 2025-04-24
Payer: MEDICARE

## 2025-04-24 ENCOUNTER — HOSPITAL ENCOUNTER (OUTPATIENT)
Facility: HOSPITAL | Age: 75
Setting detail: HOSPITAL OUTPATIENT SURGERY
Discharge: HOME | End: 2025-04-24
Attending: SURGERY | Admitting: SURGERY
Payer: MEDICARE

## 2025-04-24 VITALS
HEART RATE: 96 BPM | OXYGEN SATURATION: 95 % | TEMPERATURE: 97.8 F | WEIGHT: 212.52 LBS | RESPIRATION RATE: 16 BRPM | BODY MASS INDEX: 28.82 KG/M2 | DIASTOLIC BLOOD PRESSURE: 76 MMHG | SYSTOLIC BLOOD PRESSURE: 151 MMHG

## 2025-04-24 DIAGNOSIS — Z12.11 SCREENING FOR MALIGNANT NEOPLASM OF COLON: ICD-10-CM

## 2025-04-24 DIAGNOSIS — Z86.0100 HISTORY OF COLONIC POLYPS: ICD-10-CM

## 2025-04-24 PROCEDURE — 25010000002 PROPOFOL 10 MG/ML EMULSION: Performed by: NURSE ANESTHETIST, CERTIFIED REGISTERED

## 2025-04-24 PROCEDURE — 88305 TISSUE EXAM BY PATHOLOGIST: CPT | Performed by: SURGERY

## 2025-04-24 PROCEDURE — 25010000002 LIDOCAINE PF 2% 2 % SOLUTION: Performed by: NURSE ANESTHETIST, CERTIFIED REGISTERED

## 2025-04-24 PROCEDURE — 25810000003 LACTATED RINGERS PER 1000 ML: Performed by: NURSE ANESTHETIST, CERTIFIED REGISTERED

## 2025-04-24 RX ORDER — SODIUM CHLORIDE 9 MG/ML
40 INJECTION, SOLUTION INTRAVENOUS AS NEEDED
Status: DISCONTINUED | OUTPATIENT
Start: 2025-04-24 | End: 2025-04-24 | Stop reason: HOSPADM

## 2025-04-24 RX ORDER — LIDOCAINE HYDROCHLORIDE 20 MG/ML
INJECTION, SOLUTION EPIDURAL; INFILTRATION; INTRACAUDAL; PERINEURAL AS NEEDED
Status: DISCONTINUED | OUTPATIENT
Start: 2025-04-24 | End: 2025-04-24 | Stop reason: SURG

## 2025-04-24 RX ORDER — SODIUM CHLORIDE 0.9 % (FLUSH) 0.9 %
3 SYRINGE (ML) INJECTION EVERY 12 HOURS SCHEDULED
Status: DISCONTINUED | OUTPATIENT
Start: 2025-04-24 | End: 2025-04-24 | Stop reason: HOSPADM

## 2025-04-24 RX ORDER — PROPOFOL 10 MG/ML
VIAL (ML) INTRAVENOUS AS NEEDED
Status: DISCONTINUED | OUTPATIENT
Start: 2025-04-24 | End: 2025-04-24 | Stop reason: SURG

## 2025-04-24 RX ORDER — SODIUM CHLORIDE 0.9 % (FLUSH) 0.9 %
10 SYRINGE (ML) INJECTION AS NEEDED
Status: DISCONTINUED | OUTPATIENT
Start: 2025-04-24 | End: 2025-04-24 | Stop reason: HOSPADM

## 2025-04-24 RX ORDER — SODIUM CHLORIDE, SODIUM LACTATE, POTASSIUM CHLORIDE, CALCIUM CHLORIDE 600; 310; 30; 20 MG/100ML; MG/100ML; MG/100ML; MG/100ML
INJECTION, SOLUTION INTRAVENOUS CONTINUOUS PRN
Status: DISCONTINUED | OUTPATIENT
Start: 2025-04-24 | End: 2025-04-24 | Stop reason: SURG

## 2025-04-24 RX ORDER — SODIUM CHLORIDE, SODIUM LACTATE, POTASSIUM CHLORIDE, CALCIUM CHLORIDE 600; 310; 30; 20 MG/100ML; MG/100ML; MG/100ML; MG/100ML
30 INJECTION, SOLUTION INTRAVENOUS CONTINUOUS
Status: DISCONTINUED | OUTPATIENT
Start: 2025-04-24 | End: 2025-04-24 | Stop reason: HOSPADM

## 2025-04-24 RX ADMIN — PROPOFOL 100 MG: 10 INJECTION, EMULSION INTRAVENOUS at 13:22

## 2025-04-24 RX ADMIN — PROPOFOL 200 MCG/KG/MIN: 10 INJECTION, EMULSION INTRAVENOUS at 13:23

## 2025-04-24 RX ADMIN — SODIUM CHLORIDE, POTASSIUM CHLORIDE, SODIUM LACTATE AND CALCIUM CHLORIDE: 600; 310; 30; 20 INJECTION, SOLUTION INTRAVENOUS at 13:20

## 2025-04-24 RX ADMIN — LIDOCAINE HYDROCHLORIDE 100 MG: 20 INJECTION, SOLUTION INTRAVENOUS at 13:22

## 2025-04-24 NOTE — H&P
General Surgery/Colorectal Surgery Note    Patient Name:  Jose Francisco Gold  YOB: 1950  [id number]    Referring Provider: Seun Fishman, *      Patient Care Team:  Jamar Sher MD as PCP - General (Internal Medicine)    Subjective .     History of present illness:    HPI   referral from Dr. Sorin Sher for colonoscopy consultation. Patient denies any abdominal pain, change in bowel habit, or rectal bleeding. Denies any family history of colorectal cancer. Admits to history of colonic polyps.     History:  Past Medical History:   Diagnosis Date    High cholesterol     HL (hearing loss)        Past Surgical History:   Procedure Laterality Date    CYST REMOVAL      GALLBLADDER SURGERY         Family History   Problem Relation Age of Onset    Diabetes Paternal Grandfather        Social History     Tobacco Use    Smoking status: Every Day     Types: Cigars     Passive exposure: Current    Smokeless tobacco: Never   Vaping Use    Vaping status: Never Used   Substance Use Topics    Alcohol use: Yes     Comment: 4 beers a day    Drug use: Never       Review of Systems: See HPI    Review of Systems            Medications Prior to Admission   Medication Sig Dispense Refill Last Dose/Taking    atorvastatin (LIPITOR) 20 MG tablet TAKE 1 TABLET BY MOUTH DAILY 90 tablet 3     clotrimazole (LOTRIMIN) 1 % external solution 3-4 drops in both ear twice a day for 1 week (Patient not taking: Reported on 3/25/2025) 15 mL 1     PEG-3350/Electrolytes/Ascorbat 100 g reconstituted solution powder  (Patient not taking: Reported on 3/25/2025)       sildenafil (VIAGRA) 100 MG tablet TAKE 1 TABLET BY MOUTH AS NEEDED FOR ERECTILE DYSFUNCTION 12 tablet 1          Home Meds:      Prior to Admission medications    Medication Sig Start Date End Date Taking? Authorizing Provider   atorvastatin (LIPITOR) 20 MG tablet TAKE 1 TABLET BY MOUTH DAILY 8/19/24   Jamar Sher MD   clotrimazole (LOTRIMIN) 1 %  external solution 3-4 drops in both ear twice a day for 1 week  Patient not taking: Reported on 3/25/2025 2/13/25   Cl Gipson MD   PEG-3350/Electrolytes/Ascorbat 100 g reconstituted solution powder  3/20/25   Provider, MD Jackson   sildenafil (VIAGRA) 100 MG tablet TAKE 1 TABLET BY MOUTH AS NEEDED FOR ERECTILE DYSFUNCTION 12/18/24   Jamar Sher MD            Allergies:  Patient has no known allergies.      Objective     Vital Signs        Physical Exam:     Physical Exam    NAD, A/O x 4, normal circulation, normal respiration      Result Review :Labs  Result Review  Imaging  Med Tab  Media    Assessment & Plan     There are no diagnoses linked to this encounter.       Risks including bleeding, perforation, pain, infection, missed polyp(s). Benefits, and alternatives of Colonoscopy discussed with patient.  All questions answered.  Consent verified.         Seun Fishman MD  04/24/25 11:45 EDT

## 2025-04-24 NOTE — ANESTHESIA POSTPROCEDURE EVALUATION
Patient: Jose Francisco Gold    Procedure Summary       Date: 04/24/25 Room / Location: [facility] ENDOSCOPY 1 / [facility] ENDOSCOPY    Anesthesia Start: 1320 Anesthesia Stop: 1349    Procedure: COLONOSCOPY with hot snare polypectomy Diagnosis:       Screening for malignant neoplasm of colon      History of colonic polyps      (Screening for malignant neoplasm of colon [Z12.11])      (History of colonic polyps [Z86.0100])    Surgeons: Seun Fishman MD Provider: Kaylyn Turcios CRNA    Anesthesia Type: general ASA Status: 2            Anesthesia Type: general    Vitals  Vitals Value Taken Time   /76 04/24/25 14:04   Temp 36.6 °C (97.8 °F) 04/24/25 14:03   Pulse 92 04/24/25 14:05   Resp 16 04/24/25 14:03   SpO2 94 % 04/24/25 14:05   Vitals shown include unfiled device data.        Post Anesthesia Care and Evaluation    Post-procedure mental status: acceptable.  Pain management: satisfactory to patient    Airway patency: patent  Anesthetic complications: No anesthetic complications    Cardiovascular status: acceptable  Respiratory status: acceptable, spontaneous ventilation and room air    Comments: Per chart review

## 2025-04-28 LAB
CYTO UR: NORMAL
LAB AP CASE REPORT: NORMAL
LAB AP CLINICAL INFORMATION: NORMAL
PATH REPORT.FINAL DX SPEC: NORMAL
PATH REPORT.GROSS SPEC: NORMAL

## 2025-05-01 ENCOUNTER — OFFICE VISIT (OUTPATIENT)
Dept: INTERNAL MEDICINE | Age: 75
End: 2025-05-01
Payer: MEDICARE

## 2025-05-01 VITALS
BODY MASS INDEX: 29.53 KG/M2 | SYSTOLIC BLOOD PRESSURE: 133 MMHG | DIASTOLIC BLOOD PRESSURE: 51 MMHG | OXYGEN SATURATION: 96 % | HEART RATE: 78 BPM | HEIGHT: 72 IN | WEIGHT: 218 LBS | TEMPERATURE: 98.2 F

## 2025-05-01 DIAGNOSIS — M54.42 CHRONIC BILATERAL LOW BACK PAIN WITH BILATERAL SCIATICA: ICD-10-CM

## 2025-05-01 DIAGNOSIS — M48.062 SPINAL STENOSIS OF LUMBAR REGION WITH NEUROGENIC CLAUDICATION: ICD-10-CM

## 2025-05-01 DIAGNOSIS — R73.01 IFG (IMPAIRED FASTING GLUCOSE): ICD-10-CM

## 2025-05-01 DIAGNOSIS — Z72.0 TOBACCO ABUSE: ICD-10-CM

## 2025-05-01 DIAGNOSIS — H90.A31 MIXED CONDUCTIVE AND SENSORINEURAL HEARING LOSS OF RIGHT EAR WITH RESTRICTED HEARING OF LEFT EAR: ICD-10-CM

## 2025-05-01 DIAGNOSIS — Z12.5 SCREENING PSA (PROSTATE SPECIFIC ANTIGEN): ICD-10-CM

## 2025-05-01 DIAGNOSIS — E55.9 VITAMIN D DEFICIENCY: ICD-10-CM

## 2025-05-01 DIAGNOSIS — I95.2 HYPOTENSION DUE TO DRUGS: Primary | ICD-10-CM

## 2025-05-01 DIAGNOSIS — E78.2 MIXED HYPERLIPIDEMIA: ICD-10-CM

## 2025-05-01 DIAGNOSIS — D50.8 IRON DEFICIENCY ANEMIA SECONDARY TO INADEQUATE DIETARY IRON INTAKE: ICD-10-CM

## 2025-05-01 DIAGNOSIS — M54.41 CHRONIC BILATERAL LOW BACK PAIN WITH BILATERAL SCIATICA: ICD-10-CM

## 2025-05-01 DIAGNOSIS — G89.29 CHRONIC BILATERAL LOW BACK PAIN WITH BILATERAL SCIATICA: ICD-10-CM

## 2025-05-01 RX ORDER — VARDENAFIL HYDROCHLORIDE 20 MG/1
20 TABLET ORAL DAILY PRN
Qty: 15 TABLET | Refills: 5 | Status: SHIPPED | OUTPATIENT
Start: 2025-05-01

## 2025-05-01 NOTE — PROGRESS NOTES
"Chief Complaint   Patient presents with    Back Pain     Pt is having some sciatica pain that is on both sides. Pt states BP is running low on the bottom in the 50's. Pt has Hx of Polyps ( just had a colonoscopy and has questions regarding polyps).     ? Re viagra, and after intercourse got real weak  Bp concerns ,     Objective   Vital Signs  Vitals:    05/01/25 1352   BP: 133/51   BP Location: Left arm   Patient Position: Sitting   Pulse: 78   Temp: 98.2 °F (36.8 °C)   SpO2: 96%   Weight: 98.9 kg (218 lb)   Height: 182.9 cm (72.01\")      Body mass index is 29.56 kg/m².  Review of Systems   Constitutional: Negative.    HENT: Negative.     Eyes: Negative.    Respiratory: Negative.     Cardiovascular: Negative.    Gastrointestinal: Negative.    Endocrine: Negative.    Genitourinary: Negative.    Musculoskeletal: Negative.    Allergic/Immunologic: Negative.    Neurological: Negative.    Hematological: Negative.    Psychiatric/Behavioral: Negative.        Physical Exam  Constitutional:       General: He is not in acute distress.     Appearance: Normal appearance.   HENT:      Head: Normocephalic.      Mouth/Throat:      Mouth: Mucous membranes are moist.   Eyes:      Conjunctiva/sclera: Conjunctivae normal.      Pupils: Pupils are equal, round, and reactive to light.   Cardiovascular:      Rate and Rhythm: Normal rate and regular rhythm.      Pulses: Normal pulses.      Heart sounds: Normal heart sounds.   Pulmonary:      Effort: Pulmonary effort is normal.      Breath sounds: Normal breath sounds.   Abdominal:      General: Bowel sounds are normal.      Palpations: Abdomen is soft.   Musculoskeletal:         General: No swelling. Normal range of motion.      Cervical back: Neck supple.   Skin:     General: Skin is warm and dry.      Coloration: Skin is not jaundiced.   Neurological:      General: No focal deficit present.      Mental Status: He is alert and oriented to person, place, and time. Mental status is at " "baseline.   Psychiatric:         Mood and Affect: Mood normal.         Behavior: Behavior normal.         Thought Content: Thought content normal.         Judgment: Judgment normal.        Result Review :   No results found for: \"PROBNP\", \"BNP\"  CMP          8/1/2024    14:27 1/31/2025    13:36   CMP   Glucose 86  98    BUN 17  14    Creatinine 0.99  0.96    EGFR 81.4  84.0    Sodium 139  139    Potassium 4.3  4.4    Chloride 106  105    Calcium 8.8  9.0    Total Protein 6.5  7.1    Albumin 3.9  3.8    Globulin 2.6  3.3    Total Bilirubin 0.2  0.3    Alkaline Phosphatase 73  78    AST (SGOT) 27  29    ALT (SGPT) 24  21    Albumin/Globulin Ratio 1.5  1.2    BUN/Creatinine Ratio 17.2  14.6    Anion Gap 11.1  10.4      CBC w/diff          1/31/2025    13:36   CBC w/Diff   WBC 8.43    RBC 4.99    Hemoglobin 12.2    Hematocrit 40.5    MCV 81.2    MCH 24.4    MCHC 30.1    RDW 17.7    Platelets 309    Neutrophil Rel % 61.2    Immature Granulocyte Rel % 0.5    Lymphocyte Rel % 20.9    Monocyte Rel % 13.8    Eosinophil Rel % 2.8    Basophil Rel % 0.8       Lipid Panel          8/1/2024    14:27   Lipid Panel   Total Cholesterol 148    Triglycerides 71    HDL Cholesterol 50    VLDL Cholesterol 14    LDL Cholesterol  84    LDL/HDL Ratio 1.68       Lab Results   Component Value Date    TSH 2.220 07/26/2023      Lab Results   Component Value Date    FREET4 0.98 07/26/2023      A1C Last 3 Results          8/1/2024    14:27 1/31/2025    13:36   HGBA1C Last 3 Results   Hemoglobin A1C 5.90  5.20       PSA          8/1/2024    14:27   PSA   PSA 1.580                     Visit Diagnoses:    ICD-10-CM ICD-9-CM   1. Hypotension due to drugs  I95.2 458.8     E947.9   2. Vitamin D deficiency  E55.9 268.9   3. Mixed conductive and sensorineural hearing loss of right ear with restricted hearing of left ear  H90.A31 389.22   4. Mixed hyperlipidemia  E78.2 272.2   5. Screening PSA (prostate specific antigen)  Z12.5 V76.44   6. Tobacco abuse  " Z72.0 305.1   7. Spinal stenosis of lumbar region with neurogenic claudication  M48.062 724.03   8. IFG  R73.01 790.21   9. Iron deficiency anemia secondary to inadequate dietary iron intake  D50.8 280.1   10. Chronic bilateral low back pain with bilateral sciatica  M54.42 724.2    M54.41 724.3    G89.29 338.29       Assessment and Plan   Diagnoses and all orders for this visit:    1. Hypotension due to drugs (Primary)    2. Vitamin D deficiency    3. Mixed conductive and sensorineural hearing loss of right ear with restricted hearing of left ear    4. Mixed hyperlipidemia    5. Screening PSA (prostate specific antigen)    6. Tobacco abuse    7. Spinal stenosis of lumbar region with neurogenic claudication  -     XR Spine Lumbar 2 or 3 View; Future  -     XR Spine Thoracic 3 View; Future  -     MRI Lumbar Spine Without Contrast; Future    8. IFG    9. Iron deficiency anemia secondary to inadequate dietary iron intake    10. Chronic bilateral low back pain with bilateral sciatica    Other orders  -     vardenafil (Levitra) 20 MG tablet; Take 1 tablet by mouth Daily As Needed for Erectile Dysfunction.  Dispense: 15 tablet; Refill: 5    Low blood pressures after use of Viagra, sexual activity, discussed treatment options    Ed issues , will try different medication than Viagra, Levitra was sent in 20 mg he will take half tablet to a whole tablet daily as needed May 1, 2025    medicare wellness completed February 6, 2025,     Anemia mild new finding January 31, 2025 may need workup, referring back to Dr. Page for second opinion, EGD possibly colonoscopy,    Hyperlipidemia continues Lipitor 20 mg daily    Sciatica, spinal stenosis lumbar discussion about treatment options including Dr. Ching for second opinion will do plain x-ray of his lumbar and thoracic spine as well as an MRI of his lumbar spine referrals placed May 1, 2025,    [place] valdez nov 2022, dr pham ----ct abd / pelvis-- left adrenal ademoma,     Tob/  use and cigars use and etoh - 4 beers/ day ---cautioned June 2023--- CT chest screening February 20, 2024==Shows no acute cardiopulmonary process 4 mm right upper lobe nodule likely benign consider screening again in 1 year, referral placed for yearly screening again February 2025,, stable pulmonary nodules multiple favored to represent granulomas 1.4 cm left adrenal nodule favored to represent an adenoma, continue yearly screenings    Colonoscopy 2022, === colonoscopy endoscopy, Dr. Fishman, April 24, 2025 4  polyps    IFG, --hemoglobin A1c down to 5.2 January 31, 2025    Glaucoma, I lens implants,    Hearing loss, wears hearing aids       PSA 1.5 August 1, 2024               Follow Up   Return for Next scheduled follow up.  Patient was given instructions and counseling regarding his condition or for health maintenance advice. Please see specific information pulled into the AVS if appropriate.

## 2025-05-13 ENCOUNTER — TELEPHONE (OUTPATIENT)
Dept: SURGERY | Facility: CLINIC | Age: 75
End: 2025-05-13
Payer: MEDICARE

## 2025-05-13 NOTE — TELEPHONE ENCOUNTER
----- Message -----  From: Bubba, April, APRN  Sent: 5/8/2025   8:56 AM EDT  To: Kisha Condon    3 year colon recall. Mb    Date of completion:  04/24/25  Expected date of next:  04/01/2028    Care gap and recall updated.

## 2025-06-12 ENCOUNTER — HOSPITAL ENCOUNTER (OUTPATIENT)
Dept: GENERAL RADIOLOGY | Facility: HOSPITAL | Age: 75
Discharge: HOME | End: 2025-06-12
Payer: MEDICARE

## 2025-06-12 ENCOUNTER — HOSPITAL ENCOUNTER (OUTPATIENT)
Dept: MRI IMAGING | Facility: HOSPITAL | Age: 75
Discharge: HOME | End: 2025-06-12
Payer: MEDICARE

## 2025-06-12 DIAGNOSIS — S05.41XA FOREIGN BODY OF BOTH ORBITS: ICD-10-CM

## 2025-06-12 DIAGNOSIS — S05.42XA FOREIGN BODY OF BOTH ORBITS: ICD-10-CM

## 2025-06-12 DIAGNOSIS — M48.062 SPINAL STENOSIS OF LUMBAR REGION WITH NEUROGENIC CLAUDICATION: ICD-10-CM

## 2025-06-12 PROCEDURE — 72148 MRI LUMBAR SPINE W/O DYE: CPT

## 2025-06-12 PROCEDURE — 70200 X-RAY EXAM OF EYE SOCKETS: CPT

## 2025-06-12 PROCEDURE — 72100 X-RAY EXAM L-S SPINE 2/3 VWS: CPT

## 2025-06-12 PROCEDURE — 72072 X-RAY EXAM THORAC SPINE 3VWS: CPT

## 2025-07-08 ENCOUNTER — OFFICE VISIT (OUTPATIENT)
Dept: NEUROSURGERY | Facility: CLINIC | Age: 75
End: 2025-07-08
Payer: MEDICARE

## 2025-07-08 VITALS
SYSTOLIC BLOOD PRESSURE: 140 MMHG | HEIGHT: 72 IN | DIASTOLIC BLOOD PRESSURE: 66 MMHG | BODY MASS INDEX: 29.53 KG/M2 | WEIGHT: 218 LBS | HEART RATE: 66 BPM

## 2025-07-08 DIAGNOSIS — M47.816 SPONDYLOSIS OF LUMBAR SPINE: ICD-10-CM

## 2025-07-08 DIAGNOSIS — M48.062 SPINAL STENOSIS OF LUMBAR REGION WITH NEUROGENIC CLAUDICATION: Primary | ICD-10-CM

## 2025-07-08 PROCEDURE — 99204 OFFICE O/P NEW MOD 45 MIN: CPT | Performed by: PHYSICIAN ASSISTANT

## 2025-07-08 PROCEDURE — 1160F RVW MEDS BY RX/DR IN RCRD: CPT | Performed by: PHYSICIAN ASSISTANT

## 2025-07-08 PROCEDURE — 1159F MED LIST DOCD IN RCRD: CPT | Performed by: PHYSICIAN ASSISTANT

## 2025-07-08 NOTE — PROGRESS NOTES
"Chief Complaint  Establish Care (NEW PT/Spinal stenosis of lumbar region with neurogenic claudication, MRI /LUMBAR 06/12/25 @ [facility] /) and Back Pain (Pain comes and goes depending on what pt is doing. )    Subjective          Jose Francisco Gold who is a 74 y.o. year old male who presents to [facility] for Evaluation of the Spine.     The patient complains of pain located in the Lumbar Spine.  Patients states the pain has been present for 22 years.  The pain came on gradually.  The pain scaled level is 0.  The pain does radiate. Dermatomes are located bilaterally Lumbar at: below the knee and to the feet.  The pain is Intermittent and described as burning.  The pain is worse at no particular time of day. Patient states Rest makes the pain better.  Patient states Prolonged Walking makes the pain worse.    Associated Symptoms Include: Numbness in both legs with prolonged walking. Rarely he reports numbness in the groin. Denies weakness in the legs or loss of bowel or bladder control.  Conservative Interventions Include: None.    Was this the result of an injury or accident? : No    History of Previous Spinal Surgery?: No     reports that he has been smoking cigars. He has been exposed to tobacco smoke. He has never used smokeless tobacco.    Review of Systems   Musculoskeletal:  Positive for back pain.        Objective   Vital Signs:   /66 (BP Location: Right arm, Patient Position: Sitting, Cuff Size: Adult)   Pulse 66   Ht 182.9 cm (72.01\")   Wt 98.9 kg (218 lb)   BMI 29.56 kg/m²       Physical Exam  Constitutional:       Appearance: Normal appearance.   Pulmonary:      Effort: Pulmonary effort is normal.   Musculoskeletal:         General: No tenderness.      Comments: SLR negative   Neurological:      General: No focal deficit present.      Mental Status: He is alert and oriented to person, place, and time.      Sensory: No sensory deficit.      Motor: No weakness. "      Deep Tendon Reflexes: Reflexes normal.   Psychiatric:         Mood and Affect: Mood normal.         Behavior: Behavior normal.        Neurological Exam  Mental Status  Alert. Oriented to person, place, and time.      Result Review     I have independently interpreted the MRI of the lumbar spine without contrast from 6/12/2025 which shows multilevel degenerative disc disease and facet arthritis.  There is severe central narrowing at L3-L4 and L4-L5 with moderate bilateral foraminal narrowing at L3-L4 and severe bilateral foraminal narrowing at L4-L5.     Assessment and Plan    Diagnoses and all orders for this visit:    1. Spinal stenosis of lumbar region with neurogenic claudication (Primary)    2. Spondylosis of lumbar spine    He has pain and numbness in the legs with prolonged walking, consistent with neurogenic claudication.    There is severe canal stenosis at L4-L5 and somewhat less at L3-L4.    I expect surgery could offer benefit for the claudication symptoms.    It is unlikely to help back pain.    He may consider PT or pain management, but we discussed that generally this is less likely to help in cases of neurogenic claudication with severe stenosis in the spine.    He will take some time to consider his options and let us know how he will proceed.    The patient was counseled on basic recommendations for the reduction and prevention of back, neck, or spine pain in association with spinal disorders, including: cessation/avoidance of nicotine use, maintenance of a healthy BMI and weight, focusing on building/maintaining core strength through core exercise, and avoidance of activities which worsen the pain. The patient will monitor for changes in symptoms and notify our clinic of these changes as needed.    Follow Up   Return if symptoms worsen or fail to improve.  Patient was given instructions and counseling regarding his condition or for health maintenance advice. Please see specific information  pulled into the AVS if appropriate.

## 2025-07-14 ENCOUNTER — OFFICE VISIT (OUTPATIENT)
Dept: INTERNAL MEDICINE | Age: 75
End: 2025-07-14
Payer: MEDICARE

## 2025-07-14 VITALS
BODY MASS INDEX: 29.69 KG/M2 | HEIGHT: 72 IN | DIASTOLIC BLOOD PRESSURE: 72 MMHG | TEMPERATURE: 99.6 F | SYSTOLIC BLOOD PRESSURE: 129 MMHG | OXYGEN SATURATION: 94 % | HEART RATE: 77 BPM | WEIGHT: 219.2 LBS

## 2025-07-14 DIAGNOSIS — Z12.5 SCREENING PSA (PROSTATE SPECIFIC ANTIGEN): ICD-10-CM

## 2025-07-14 DIAGNOSIS — Z72.0 TOBACCO ABUSE: ICD-10-CM

## 2025-07-14 DIAGNOSIS — Z86.0100 HISTORY OF COLONIC POLYPS: ICD-10-CM

## 2025-07-14 DIAGNOSIS — R73.01 IFG (IMPAIRED FASTING GLUCOSE): ICD-10-CM

## 2025-07-14 DIAGNOSIS — N20.0 KIDNEY STONE ON LEFT SIDE: Primary | ICD-10-CM

## 2025-07-14 DIAGNOSIS — E78.2 MIXED HYPERLIPIDEMIA: ICD-10-CM

## 2025-07-14 DIAGNOSIS — I95.2 HYPOTENSION DUE TO DRUGS: ICD-10-CM

## 2025-07-14 DIAGNOSIS — E55.9 VITAMIN D DEFICIENCY: ICD-10-CM

## 2025-07-14 PROCEDURE — 1126F AMNT PAIN NOTED NONE PRSNT: CPT | Performed by: INTERNAL MEDICINE

## 2025-07-14 PROCEDURE — 99214 OFFICE O/P EST MOD 30 MIN: CPT | Performed by: INTERNAL MEDICINE

## 2025-07-14 RX ORDER — CYCLOBENZAPRINE HCL 10 MG
10 TABLET ORAL 2 TIMES DAILY PRN
COMMUNITY
Start: 2025-07-06 | End: 2025-07-14

## 2025-07-14 RX ORDER — KETOROLAC TROMETHAMINE 10 MG/1
10 TABLET, FILM COATED ORAL EVERY 6 HOURS PRN
COMMUNITY
Start: 2025-07-06 | End: 2025-07-14

## 2025-07-14 NOTE — PROGRESS NOTES
"Chief Complaint   Patient presents with    Hospital Follow Up Visit     73 yo male presents for a hospital f/u visit for kidney stones.  Pt states he  became sick after eating dinner and started to have lower back pain following vomiting episodes.   He believes he has passed them since ER visit. Still has tenderness in lower left back area.        Objective   Vital Signs  Vitals:    07/14/25 1623   BP: 129/72   BP Location: Right arm   Patient Position: Sitting   Cuff Size: Adult   Pulse: 77   Temp: 99.6 °F (37.6 °C)   TempSrc: Skin   SpO2: 94%   Weight: 99.4 kg (219 lb 3.2 oz)   Height: 182.9 cm (72.01\")      Body mass index is 29.72 kg/m².  Review of Systems   Constitutional: Negative.    HENT: Negative.     Eyes: Negative.    Respiratory: Negative.     Cardiovascular: Negative.    Gastrointestinal: Negative.    Endocrine: Negative.    Genitourinary: Negative.    Musculoskeletal: Negative.    Allergic/Immunologic: Negative.    Neurological: Negative.    Hematological: Negative.    Psychiatric/Behavioral: Negative.        Physical Exam  Constitutional:       General: He is not in acute distress.     Appearance: Normal appearance.   HENT:      Head: Normocephalic.      Mouth/Throat:      Mouth: Mucous membranes are moist.   Eyes:      Conjunctiva/sclera: Conjunctivae normal.      Pupils: Pupils are equal, round, and reactive to light.   Cardiovascular:      Rate and Rhythm: Normal rate and regular rhythm.      Pulses: Normal pulses.      Heart sounds: Normal heart sounds.   Pulmonary:      Effort: Pulmonary effort is normal.      Breath sounds: Normal breath sounds.   Musculoskeletal:         General: No swelling. Normal range of motion.      Cervical back: Neck supple.   Skin:     General: Skin is warm and dry.      Coloration: Skin is not jaundiced.   Neurological:      General: No focal deficit present.      Mental Status: He is alert and oriented to person, place, and time. Mental status is at baseline. " "  Psychiatric:         Mood and Affect: Mood normal.         Behavior: Behavior normal.         Thought Content: Thought content normal.         Judgment: Judgment normal.     No significant flank tenderness, no skin lesions on the left flank or low back area, negative straight leg raise bilateral,  Result Review :   No results found for: \"PROBNP\", \"BNP\"  CMP          8/1/2024    14:27 1/31/2025    13:36   CMP   Glucose 86  98    BUN 17  14    Creatinine 0.99  0.96    EGFR 81.4  84.0    Sodium 139  139    Potassium 4.3  4.4    Chloride 106  105    Calcium 8.8  9.0    Total Protein 6.5  7.1    Albumin 3.9  3.8    Globulin 2.6  3.3    Total Bilirubin 0.2  0.3    Alkaline Phosphatase 73  78    AST (SGOT) 27  29    ALT (SGPT) 24  21    Albumin/Globulin Ratio 1.5  1.2    BUN/Creatinine Ratio 17.2  14.6    Anion Gap 11.1  10.4      CBC w/diff          1/31/2025    13:36   CBC w/Diff   WBC 8.43    RBC 4.99    Hemoglobin 12.2    Hematocrit 40.5    MCV 81.2    MCH 24.4    MCHC 30.1    RDW 17.7    Platelets 309    Neutrophil Rel % 61.2    Immature Granulocyte Rel % 0.5    Lymphocyte Rel % 20.9    Monocyte Rel % 13.8    Eosinophil Rel % 2.8    Basophil Rel % 0.8       Lipid Panel          8/1/2024    14:27   Lipid Panel   Total Cholesterol 148    Triglycerides 71    HDL Cholesterol 50    VLDL Cholesterol 14    LDL Cholesterol  84    LDL/HDL Ratio 1.68       Lab Results   Component Value Date    TSH 2.220 07/26/2023      Lab Results   Component Value Date    FREET4 0.98 07/26/2023      A1C Last 3 Results          8/1/2024    14:27 1/31/2025    13:36   HGBA1C Last 3 Results   Hemoglobin A1C 5.90  5.20       PSA          8/1/2024    14:27   PSA   PSA 1.580                     Visit Diagnoses:    ICD-10-CM ICD-9-CM   1. Kidney stone on left side  N20.0 592.0   2. Mixed hyperlipidemia  E78.2 272.2   3. Vitamin D deficiency  E55.9 268.9   4. Tobacco abuse  Z72.0 305.1   5. IFG  R73.01 790.21   6. Hypotension due to drugs  I95.2 " 458.8     E947.9   7. History of colonic polyps  Z86.0100 V12.72   8. Screening PSA (prostate specific antigen)  Z12.5 V76.44       Assessment and Plan   Diagnoses and all orders for this visit:    1. Kidney stone on left side (Primary)    2. Mixed hyperlipidemia    3. Vitamin D deficiency    4. Tobacco abuse    5. IFG    6. Hypotension due to drugs    7. History of colonic polyps    8. Screening PSA (prostate specific antigen)        Kidney stones?,  ---Left flank pain nausea vomiting general sickness malaise, approximately 10 days ago July 4, 2025 ===ended up going to the emergency room CT abdomen and pelvis [facility], showed small left adrenal nodule small nonobstructing left renal calculus no aggressive appearing osseous lesions are identified diffuse atherosclerotic disease involving the aorta and iliac,    Ed issues , cont  Levitra was sent in 20 mg he will take half tablet to a whole tablet daily as needed May 1, 2025    medicare wellness completed February 6, 2025,     Anemia mild new finding January 31, 2025 may need workup, referring back to Dr. Page for second opinion,=== status post colonoscopy April 24, 2025 Dr. Fishman, found 3 polyps,    Hyperlipidemia continues Lipitor 20 mg daily    spinal stenosis lumbar MRI June 12, 2025 -----discussion about treatment options including Dr. Ching for second opinion MRI shows severe spinal canal central stenosis L3-4 and L4-5 with foraminal stenosis severe at L4-5 levels, did see Dr. Ching second opinion    [place] valdez nov 2022, dr pham ----ct abd / pelvis-- left adrenal ademoma,     Tob/ use and cigars use and etoh - 4 beers/ day ---cautioned June 2023--- CT chest screening February 20, 2024==Shows no acute cardiopulmonary process 4 mm right upper lobe nodule likely benign consider screening again in 1 year, referral placed for yearly screening again February 2025,, stable pulmonary nodules multiple favored to represent granulomas 1.4 cm left  adrenal nodule favored to represent an adenoma, continue yearly screenings    Colonoscopy 2022, === colonoscopy endoscopy, Dr. Fishman, April 24, 2025 4  polyps    IFG, --hemoglobin A1c down to 5.2 January 31, 2025    Glaucoma, I lens implants,    Hearing loss, wears hearing aids       PSA 1.5 August 1, 2024                 Follow Up   Return for Next scheduled follow up.  Patient was given instructions and counseling regarding his condition or for health maintenance advice. Please see specific information pulled into the AVS if appropriate.

## (undated) DEVICE — SOL IRRG H2O PL/BG 1000ML STRL

## (undated) DEVICE — SOL IRR H2O BO 1000ML STRL

## (undated) DEVICE — THE STERILE LIGHT HANDLE COVER IS USED WITH STERIS SURGICAL LIGHTING AND VISUALIZATION SYSTEMS.

## (undated) DEVICE — STERILE POLYISOPRENE POWDER-FREE SURGICAL GLOVES: Brand: PROTEXIS

## (undated) DEVICE — DEFENDO AIR WATER SUCTION AND BIOPSY VALVE KIT FOR  OLYMPUS: Brand: DEFENDO AIR/WATER/SUCTION AND BIOPSY VALVE

## (undated) DEVICE — THE SINGLE USE ETRAP – POLYP TRAP IS USED FOR SUCTION RETRIEVAL OF ENDOSCOPICALLY REMOVED POLYPS.: Brand: ETRAP

## (undated) DEVICE — SNAR E/S POLYP SNAREMASTER OVL/10MM 2.8X2300MM YEL

## (undated) DEVICE — SOLIDIFIER LIQLOC PLS 1500CC BT

## (undated) DEVICE — Device

## (undated) DEVICE — STERILE POLYISOPRENE POWDER-FREE SURGICAL GLOVES WITH EMOLLIENT COATING: Brand: PROTEXIS

## (undated) DEVICE — LINER SURG CANSTR SXN S/RIGD 1500CC

## (undated) DEVICE — TUBING, SUCTION, 1/4" X 10', STRAIGHT: Brand: MEDLINE

## (undated) DEVICE — CONN JET HYDRA H20 AUXILIARY DISP